# Patient Record
Sex: MALE | Race: WHITE | NOT HISPANIC OR LATINO | URBAN - METROPOLITAN AREA
[De-identification: names, ages, dates, MRNs, and addresses within clinical notes are randomized per-mention and may not be internally consistent; named-entity substitution may affect disease eponyms.]

---

## 2023-11-10 ENCOUNTER — APPOINTMENT (OUTPATIENT)
Dept: UROLOGY | Facility: CLINIC | Age: 33
End: 2023-11-10

## 2024-09-12 ENCOUNTER — LAB (OUTPATIENT)
Dept: LAB | Facility: LAB | Age: 34
End: 2024-09-12
Payer: COMMERCIAL

## 2024-09-12 ENCOUNTER — OFFICE VISIT (OUTPATIENT)
Dept: GASTROENTEROLOGY | Facility: CLINIC | Age: 34
End: 2024-09-12
Payer: COMMERCIAL

## 2024-09-12 VITALS
TEMPERATURE: 98.4 F | BODY MASS INDEX: 28.89 KG/M2 | DIASTOLIC BLOOD PRESSURE: 73 MMHG | WEIGHT: 225 LBS | HEART RATE: 91 BPM | SYSTOLIC BLOOD PRESSURE: 114 MMHG

## 2024-09-12 DIAGNOSIS — R10.9 ABDOMINAL DISCOMFORT: ICD-10-CM

## 2024-09-12 DIAGNOSIS — R19.7 DIARRHEA, UNSPECIFIED TYPE: ICD-10-CM

## 2024-09-12 DIAGNOSIS — R14.0 BLOATING: ICD-10-CM

## 2024-09-12 DIAGNOSIS — R19.7 DIARRHEA, UNSPECIFIED TYPE: Primary | ICD-10-CM

## 2024-09-12 LAB
ALBUMIN SERPL BCP-MCNC: 4.6 G/DL (ref 3.4–5)
ALP SERPL-CCNC: 40 U/L (ref 33–120)
ALT SERPL W P-5'-P-CCNC: 23 U/L (ref 10–52)
ANION GAP SERPL CALC-SCNC: 10 MMOL/L (ref 10–20)
AST SERPL W P-5'-P-CCNC: 29 U/L (ref 9–39)
BILIRUB SERPL-MCNC: 0.8 MG/DL (ref 0–1.2)
BUN SERPL-MCNC: 31 MG/DL (ref 6–23)
CALCIUM SERPL-MCNC: 9.8 MG/DL (ref 8.6–10.6)
CHLORIDE SERPL-SCNC: 105 MMOL/L (ref 98–107)
CO2 SERPL-SCNC: 31 MMOL/L (ref 21–32)
CREAT SERPL-MCNC: 1.36 MG/DL (ref 0.5–1.3)
CRP SERPL-MCNC: <0.1 MG/DL
EGFRCR SERPLBLD CKD-EPI 2021: 71 ML/MIN/1.73M*2
ERYTHROCYTE [DISTWIDTH] IN BLOOD BY AUTOMATED COUNT: 12.6 % (ref 11.5–14.5)
GLIADIN PEPTIDE IGA SER IA-ACNC: <1 U/ML
GLUCOSE SERPL-MCNC: 86 MG/DL (ref 74–99)
HCT VFR BLD AUTO: 38.8 % (ref 41–52)
HGB BLD-MCNC: 13.1 G/DL (ref 13.5–17.5)
MCH RBC QN AUTO: 29 PG (ref 26–34)
MCHC RBC AUTO-ENTMCNC: 33.8 G/DL (ref 32–36)
MCV RBC AUTO: 86 FL (ref 80–100)
NRBC BLD-RTO: 0 /100 WBCS (ref 0–0)
PLATELET # BLD AUTO: 248 X10*3/UL (ref 150–450)
POTASSIUM SERPL-SCNC: 4.4 MMOL/L (ref 3.5–5.3)
PROT SERPL-MCNC: 6.9 G/DL (ref 6.4–8.2)
RBC # BLD AUTO: 4.52 X10*6/UL (ref 4.5–5.9)
SODIUM SERPL-SCNC: 142 MMOL/L (ref 136–145)
TSH SERPL-ACNC: 2.01 MIU/L (ref 0.44–3.98)
TTG IGA SER IA-ACNC: <1 U/ML
WBC # BLD AUTO: 5 X10*3/UL (ref 4.4–11.3)

## 2024-09-12 PROCEDURE — 80053 COMPREHEN METABOLIC PANEL: CPT

## 2024-09-12 PROCEDURE — 36415 COLL VENOUS BLD VENIPUNCTURE: CPT

## 2024-09-12 PROCEDURE — 83516 IMMUNOASSAY NONANTIBODY: CPT

## 2024-09-12 PROCEDURE — 99204 OFFICE O/P NEW MOD 45 MIN: CPT | Performed by: NURSE PRACTITIONER

## 2024-09-12 PROCEDURE — 99214 OFFICE O/P EST MOD 30 MIN: CPT | Performed by: NURSE PRACTITIONER

## 2024-09-12 PROCEDURE — 86140 C-REACTIVE PROTEIN: CPT

## 2024-09-12 PROCEDURE — 85027 COMPLETE CBC AUTOMATED: CPT

## 2024-09-12 PROCEDURE — 84443 ASSAY THYROID STIM HORMONE: CPT

## 2024-09-12 NOTE — PATIENT INSTRUCTIONS
Recommendations  Take Metamucil daily. We would start with 1 teaspoon daily in 8 ounces of water and then after a week increase to 2 teaspoons daily and then in the third week increase to 3 teaspoons daily (1 tablespoon) as tolerated. It is best to start at a low dose and work your way up so that you do not have side effects from the fiber supplement, which may include bloating, increased flatulence. You need to make sure you drink plenty of water when you take the fiber so we can keep the stool softer, bulkier, and easier to pass. Hopefully this would improve the consistency of stool, ease of defecation, and resolve any intermittent diarrhea and bleeding.    Decrease caffeine.  Avoid dairy or if consuming take Lactaid.  Obtain labs and stool tests. If fecal calprotectin is elevated will need colonoscopy to further evaluate.  Further recommendations pending above work-up. Please call the office at [phone] with any questions or concerns.

## 2024-09-12 NOTE — PROGRESS NOTES
Valdo Ernst is a 33 y.o. male with past medical history of lactose intolerance who presents today for bowel issues. He reports a several year history of progressively worsening bowel issues. Worse with certain foods like dairy which he continues to sometimes eat. However he states he has not had a normal bowel movement in over a year and since Summer 2024 has been the worst it has been. He used to have 1 formed stool daily and is now having numerous BM. Stools are smaller and he will have to return a few times back to back in the morning due to incomplete emptying. May have an additional 1-2 later in the day. Sometimes diarrhea. Has seen blood when wiping. Now having abdominal discomfort, lots of bloating. No nausea, vomiting, or unintentional weight loss. No oral ulcerations, joint pains, or skin rashes. Tried Metamucil for 1 week with no change so stopped taking this. No NSAIDS. No prior EGD or colonoscopy. Of note he takes pre-work out supplement daily that has high dose caffeine in it.    Social history: Runs deck building company.  with 2 daughters. No tobacco. Rare alcohol, a few times per year. Denies illicit drug use.    Family history: Father, maternal uncle, paternal grandfather, and maternal cousin have Crohn's disease. Paternal cousin may have celiac disease. Denies family history of colon cancer.     Past Surgical History:   Procedure Laterality Date    OTHER SURGICAL HISTORY  10/29/2021    [place] tooth extraction     No current outpatient medications on file.     No current facility-administered medications for this visit.       Review of Systems  Review of Systems negative except as noted in HPI.    Objective     /73   Pulse 91   Temp 36.9 °C (98.4 °F)   Wt 102 kg (225 lb)   BMI 28.89 kg/m²      Physical Exam  Constitutional:  No acute distress. Normal appearance. Not ill-appearing.  HENT:  Head normocephalic and atraumatic. Conjunctivae normal.  Cardiovascular:  Normal rate.  Regular rhythm.  Pulmonary:  Pulmonary effort normal. No respiratory distress. Breath sounds clear.  Abdominal:  Abdomen is flat and soft. There is no distension. No tenderness or guarding.  Skin: Dry.  Neurological:  Alert and oriented.  Psychiatric:  Mood and affect normal.    Assessment/Plan     33 y.o. male with strong history of Crohn's disease who presents today for initial clinic visit for 1-2 year history of change in bowel habits with increased frequency, smaller/looser consistency, abdominal discomfort, and bloating. We discussed adding fiber supplement as well as labs and stool tests to exclude thyroid dysfunction, celiac disease, and IBD. We also discussed that his pre-work out supplement may be contributing to symptoms and he will plan to decrease dose by half to see if this makes a difference in his symptoms.    Recommendations  Take Metamucil daily. We would start with 1 teaspoon daily in 8 ounces of water and then after a week increase to 2 teaspoons daily and then in the third week increase to 3 teaspoons daily (1 tablespoon) as tolerated. It is best to start at a low dose and work your way up so that you do not have side effects from the fiber supplement, which may include bloating, increased flatulence. You need to make sure you drink plenty of water when you take the fiber so we can keep the stool softer, bulkier, and easier to pass. Hopefully this would improve the consistency of stool, ease of defecation, and resolve any intermittent diarrhea and bleeding.    Decrease work-out supplement/caffeine.  Avoid dairy or if consuming take Lactaid.  Obtain labs and stool tests. If fecal calprotectin is elevated will need colonoscopy to further evaluate. This was briefly discussed today.  Further recommendations pending above work-up.     Electronically signed by: Modesta Cole CNP on 9/12/2024 at 1:52 PM

## 2024-09-13 ENCOUNTER — LAB (OUTPATIENT)
Dept: LAB | Facility: LAB | Age: 34
End: 2024-09-13
Payer: COMMERCIAL

## 2024-09-13 DIAGNOSIS — R19.7 DIARRHEA, UNSPECIFIED TYPE: ICD-10-CM

## 2024-09-13 DIAGNOSIS — R10.9 ABDOMINAL DISCOMFORT: ICD-10-CM

## 2024-09-13 PROCEDURE — 83993 ASSAY FOR CALPROTECTIN FECAL: CPT

## 2024-09-15 LAB
GLIADIN PEPTIDE IGG SER IA-ACNC: <0.56 FLU (ref 0–4.99)
TTG IGG SER IA-ACNC: <0.82 FLU (ref 0–4.99)

## 2024-09-17 LAB — CALPROTECTIN STL-MCNT: 39 UG/G

## 2024-10-17 ASSESSMENT — ENCOUNTER SYMPTOMS
CARDIOVASCULAR NEGATIVE: 1
ARTHRALGIAS: 1
MYALGIAS: 1
RESPIRATORY NEGATIVE: 1
CONSTITUTIONAL NEGATIVE: 1

## 2024-10-17 NOTE — PATIENT INSTRUCTIONS
May use PRICE therapy as needed.  Start into Physical Therapy 1-2 times a week for 8-10 weeks with manual therapy as well as dry needling and IASTM  Stressed the importance of wearing shoes with good stability control to help with the biomechanics affecting the lower legs  Stressed the importance of wearing full foot insoles to help with the biomechanics affecting the lower legs  Recommendation over-the-counter calcium 500mg, 3 times a day with vitamin-D3 8426-2234+ units a day with food as well as a daily multivitamin  Recommendation over-the-counter Move Free for joint health.  May take OTC Tylenol Extra Strength or OTC Tylenol Arthritis, taking one every 6-8 hours with food as needed for pain management.  Patient advised regarding the risk and/or potential adverse reactions and/or side effects of any prescribed medications along with any over-the-counter medications or any supplements used. Patient advised to seek immediate medical care if any adverse reactions occur. The patient and/or patient(s) parent(s) verbalized their understanding.  Discussed in detail with the patient to the level of their understanding the possibility in the future of regenerative injections versus corticosteroids injections  Possibility in future of MR Arthrogram of LEFT HIP to rule out tendon vs ligament vs labral tear vs fracture vs other. MSK to read  Possibility in future of MRI of LUMBAR spine to rule out disc herniation vs fracture vs other. MSK to read   Follow up 6 weeks

## 2024-10-17 NOTE — PROGRESS NOTES
New patient  History Of Present Illness  Valdo Ernst is a 33 y.o. male presenting with LEFT hip pain. Pt owns a deck building company.  Pt states that a majority of his past injuries have been on his LEFT side. In the past year, he has tried to go away from strength training and in to more stretching. Pain has worsened within the past month that he is in today for further evaluation. Pain in the sciatica, wrapping in to lateral aspect of his hip in to his groin and down his leg. Notes tightness and pain in all movement. Pain is to the point of it waking him up. Pain with all range of motion. Denies any numbness or tingling. Pt has tried: foam rolling, stretching, soft tissue massage, heat, ice, NSAIDs as needed. Rates current pain as a 0/10 but with activity and daily activities a 8/10.  We discussed treatment options.  Upon exam patient has indications of an SI joint dysfunction as well as a upper glutes strain and low back strain.  Patient also has a leg length discrepancy noted on exam.  As such after discussion we will order x-rays of the lower back and hips and pelvis to evaluate for leg length discrepancy or other occult pathology.  Patient will start into physical therapy as soon as he is able.  Advised patient to look into getting insoles and wearing supportive footwear.  Patient can follow-up in 6 weeks and we can reevaluate at that time with consideration of more advanced imaging such as MRI or CT is indicated.  Patient verbalizes understanding and agreement with plan of care.    Past Medical History  He has no past medical history on file.    Surgical History  He has a past surgical history that includes Other surgical history (10/29/2021) and Vasectomy.     Social History  He reports that he has never smoked. He has never used smokeless tobacco. He reports current alcohol use. He reports that he does not use drugs.    Family History  Family History   Problem Relation Name Age of Onset    Crohn's  "disease Father          Allergies  Patient has no known allergies.    Review of Systems  Review of Systems   Constitutional: Negative.    Respiratory: Negative.     Cardiovascular: Negative.    Musculoskeletal:  Positive for arthralgias and myalgias.   All other systems reviewed and are negative.    Last Recorded Vitals  /78 (BP Location: Right arm, Patient Position: Sitting, BP Cuff Size: Large adult)   Pulse 67   Ht 1.88 m (6' 2\")   Wt 104 kg (230 lb)   BMI 29.53 kg/m²      The , VIKY RUBIN was present during today's visit and not limited to physical examination!    Examination:  Left Hip   Edema: Negative   Ecchymosis/Bruising: Negative.   Percussion Test: Negative.   Tuning Fork Test: Negative.   Orientation: Symmetrical.     ROM:   Positive Internal Rotation (30-35 degrees) decreased,  Positive External Rotation (45-60 degrees) decreased,  Flexion (120 degrees)   Extension (15-30 degrees)   ABduction (45-50 degrees)   Positive ADduction (20-30 degrees) decreased, causes pain  FROM Sacral Flexion and Sacral Extension.            Muscle Strength:   +5/+5 Hamstring Flexion  +5/+5 Quadricep Extension         +5/+5 Hip Flexion   +5/+5Hip Extension                   +5/+5 Hip ABduction away from body   +5/+5 Hip ADduction towards body           +5/+5 Hip Internal Rotation at 90 Degrees   +5/+5 Hip External Rotation at 90 Degrees                   +5/+5 Hip Internal Rotation at 0 Degrees   +5/+5 Hip External Rotation at 0 Degrees     DTR/Neurological:  Sensation Intact, 2-Point Discrimination: Negative.            Palpation:  Positive  tender to palpation over Left Hip Flexor and ADDuctor(s)           Vascular:   Negative: Normal Pulses   +2/+4, Femoral Artery, DP, PT  Capillary Refill < 2 seconds.            Function Tests:  Test for Rectus Femoris Contracture: Positive  Hip Extension Test: Positive    Iliotibial Tract Test: Positive    Mark Anthony :  Positive    Mark Anthony Test:  Positive    Steve " Test: Positive              Hip/Pelvis - Flexibility:  Hamstring Positive   Hip Flexor  Positive    IT-Band  Positive          Hip/Pelvis - Hip Contractures:  Finger Tip Test: Negative.   Mark Anthony Test: Negative.   Mark Anthony  Test: Negative.   Piriformis Test: Negative.     Hip/Pelvis - Hip Dysplasia:  Trochanter Irritation Sign: Negative.   Galeazzi Test: Negative.   Kalchschmidt Test: Negative.            Hip/Pelvis - Impingement/Labrum:  GAVIN Test: Negative      FADIR Test:   Negative   Hip Scouring Test:  Positive        Mcdaniel's Test:  Positive        Posterior Labrum (Posterior Margin) Test:  Negative      Posterior Impingement (Posterior Labrum) [Torque] Test: Negative    Anterior Impingement (Anterior Labrum) Test: Negative   Psoas Sign:   Negative               Hip/Pelvis - IT Band Syndrome:  Steve's (Iliotibial Tract) Test: Negative.   Mark Compression Test: Negative.   J-Sign: Negative.            Hip/Pelvis - SFE:  Drehmann Test: Negative.            Hip/Pelvis - Trochanteric/Pelvis Muscle Function:  Trendelenburg/Duchenne Sign: Negative.   Duchenne Sign: Negative.      Leg Length:  Leg Length Supine: Positive LEFT leg shorter than the Right  and Will verify with standing erect pelvis xray   Leg Length Supine to Seated (Derbolowsky Sign): Positive LEFT leg shorter than the Right  and Will verify with standing erect pelvis xray   ---------------------------------------------------  Examination:  Left Lumbar Spine  Edema: Negative.   TART Findings: Positive  Tenderness Left lower lumbar spine.   Ecchymosis/Bruising: Negative.   Percussion Test (LUMBAR): Negative.   Tuning Fork Test (LUMBAR): Negative.   Percussion (Sacrum): Negative.   Tuning Fork (Sacrum): Negative.     Orientation:  Orientation (LUMBAR): Negative    Orientation (Sacrum): Negative     ROM (LUMBAR):   Negative     Sacrum:  Standing Flexion Test: Negative   Seated Flexion Test: Negative   Spring Test: Negative   Sacral Somatic  Dysfunction: Negative   Hip Flexor Tightness: Positive   Hamstring Tightness: Positive           Muscle Strength:   +5/+5 Hamstring Flexion  +5/+5 Quadricep Extension  +5/+5 Hip Flexion  +5/+5 Hip Extension  +5/+5 Hip ABduction toward body  +5/+5 Hip ADduction away from body  +5/+5 Hip Internal Rotation at 90 Degrees  +5/+5 Hip External Rotation at 90 Degrees  +5/+5 Hip Internal Rotation at 0 Degrees  +5/+5 Hip External Rotation at 0 Degrees.            DTR/Neurological: 2-Point Discrimination:  Negative,Toe Walk normal,Heel Walk normal   +2/+4 Patellar Reflex (L-4)  +2/+4 Posterior Tibialis and Medial Hamstrings Reflex (L5)  +2/+4 Achilles Reflex (S-1).            Sensation/Neurological Lumbar:   Negative Sensation Intact, 2-Point Discrimination    Negative L1: Low back, hips, and groin  Negative L2: Low back and front of inside of upper leg/thigh  Negative L3: Low back and front of upper leg/thigh  Negative L4: Low back, front of upper leg/thigh, front of lower leg/calf, front of medial area of knee, and inside of ankle  Negative L5: Low back, front and lateral knee, front and outside of lower leg/calf, top and bottom of foot and first four toes especially big toe.            Sensation/Neurological Sacrum:   Negative  Sensation Intact, 2 -Point Discrimination Test:    Negative S1: low back, back of upper leg/thigh, back and inside of lower leg, calf and little toe  Negative S2: Buttocks, genitals, back of upper leg/thigh and calves  Negative S3: Buttocks and genitals  Negative S4: Buttocks  Negative S5: Buttocks.     Sensation/Neurological Coccygeal:   Negative Sensation Intact, 2-Point Discrimination:    Negative Coccyx: Buttocks and area of tailbone.     Palpation: Positive  Tender to Palpation over the Left Lumbar Spine            Vascular:   Capillary Refill < 2 seconds  +2/+4Carotid  +2/+4 Dorsalis Pedis  +2/+4 Posterior Tibial.                Low Back-Disc Injury:  Valsalva Maneuver: Negative.   Fermoral  Nerve Traction Test: Negative.   Slump Test: Negative.   Cross Test Seated: Negative.   Seated Straight Leg Raise: Negative.   Laseague Sign: Negative.   Laseague Differential Test: Negative.   [place]ague Drop Test: Negative.   Seated Laseague Test: Negative.    Reverse Laseague Test: Negative.   Tip Toe Heel Walking Test: Negative.   Jose Prone Knee Flexion Test: Negative.   Multifidus Toe Touch Test (MT3): Negative.  Prone Instability Test (PIT): Negative  Multifidus Lift Test (MLT): Negative       Low Back-SI Joint:  Three-Phase Hyperextension Test: Negative.    Yeoman Test: Negative.   Sacroilliac Stress Test: Negative.   Abduction Stress Test: Negative.           Low Back-Spondy:  Stork Test: Negative.   Sphinx Test: Negative.   Modified Sphinx Test: Negative.         Feet/Foot:   Positive BILATERAL Valgus foot     Imaging and Diagnostics Review:  Plain radiograph imaging ordered and independently reviewed.  X-rays independently reviewed no acute fractures or dislocations noted patient does have some mild degenerative disc disease of the lower back as well as a mild leg length discrepancy as noted below patient x-rays appear otherwise unremarkable.  Left leg shorter than the Right leg by the following  Iliac Crest 3 mm   Sacral Base 2 mm  Femoral heads 4 mm   Median difference of Left leg shorter than the Right leg is 3 mm   Assessment   1. Left hip pain    2. Muscle strain of gluteal region, left, initial encounter    3. Strain of flexor muscle of left hip, initial encounter    4. SI (sacroiliac) joint dysfunction    5. Lumbar strain, initial encounter    6. Left hip impingement syndrome    7. Leg length discrepancy        Plan   Treatment or Intervention:  May use PRICE therapy as needed.  Start into Physical Therapy 1-2 times a week for 8-10 weeks with manual therapy as well as dry needling and IASTM  Stressed the importance of wearing shoes with good stability control to help with the biomechanics  affecting the lower legs  Stressed the importance of wearing full foot insoles to help with the biomechanics affecting the lower legs  Recommendation over-the-counter calcium 500mg, 3 times a day with vitamin-D3 2294-0400+ units a day with food as well as a daily multivitamin  Recommendation over-the-counter Move Free for joint health.  May take OTC Tylenol Extra Strength or OTC Tylenol Arthritis, taking one every 6-8 hours with food as needed for pain management.  Patient advised regarding the risk and/or potential adverse reactions and/or side effects of any prescribed medications along with any over-the-counter medications or any supplements used. Patient advised to seek immediate medical care if any adverse reactions occur. The patient and/or patient(s) parent(s) verbalized their understanding.  Discussed in detail with the patient to the level of their understanding the possibility in the future of regenerative injections versus corticosteroids injections  Possibility in future of MR Arthrogram of LEFT HIP to rule out tendon vs ligament vs labral tear vs fracture vs other. MSK to read  Possibility in future of MRI of LUMBAR spine to rule out disc herniation vs fracture vs other. MSK to read     Diagnostic studies:         Activity Instructions, Restrictions, and Accommodations:      Consultations/Referrals:  Physical therapy    Follow-up:  Follow up 6 weeks  Total appointment time _45_ minutes. Greater than 50% spent counseling patient on results of physical exam, treatment options as well as results of ordered imaging and treatment for results, need for PT and expected outcomes, as well as discussing possible medications.    Hao Sahni CNP

## 2024-10-18 ENCOUNTER — HOSPITAL ENCOUNTER (OUTPATIENT)
Dept: RADIOLOGY | Facility: CLINIC | Age: 34
Discharge: HOME | End: 2024-10-18
Payer: COMMERCIAL

## 2024-10-18 ENCOUNTER — OFFICE VISIT (OUTPATIENT)
Dept: SPORTS MEDICINE | Facility: CLINIC | Age: 34
End: 2024-10-18
Payer: COMMERCIAL

## 2024-10-18 VITALS
SYSTOLIC BLOOD PRESSURE: 122 MMHG | HEIGHT: 74 IN | DIASTOLIC BLOOD PRESSURE: 78 MMHG | HEART RATE: 67 BPM | BODY MASS INDEX: 29.52 KG/M2 | WEIGHT: 230 LBS

## 2024-10-18 DIAGNOSIS — M21.70 LEG LENGTH DISCREPANCY: ICD-10-CM

## 2024-10-18 DIAGNOSIS — M53.3 SI (SACROILIAC) JOINT DYSFUNCTION: ICD-10-CM

## 2024-10-18 DIAGNOSIS — S76.012A MUSCLE STRAIN OF GLUTEAL REGION, LEFT, INITIAL ENCOUNTER: ICD-10-CM

## 2024-10-18 DIAGNOSIS — M25.552 LEFT HIP PAIN: ICD-10-CM

## 2024-10-18 DIAGNOSIS — S39.012A LUMBAR STRAIN, INITIAL ENCOUNTER: ICD-10-CM

## 2024-10-18 DIAGNOSIS — S76.012A STRAIN OF FLEXOR MUSCLE OF LEFT HIP, INITIAL ENCOUNTER: ICD-10-CM

## 2024-10-18 DIAGNOSIS — M25.852 LEFT HIP IMPINGEMENT SYNDROME: ICD-10-CM

## 2024-10-18 PROCEDURE — 1036F TOBACCO NON-USER: CPT | Performed by: NURSE PRACTITIONER

## 2024-10-18 PROCEDURE — 73502 X-RAY EXAM HIP UNI 2-3 VIEWS: CPT | Mod: LT

## 2024-10-18 PROCEDURE — 3008F BODY MASS INDEX DOCD: CPT | Performed by: NURSE PRACTITIONER

## 2024-10-18 PROCEDURE — 99204 OFFICE O/P NEW MOD 45 MIN: CPT | Performed by: NURSE PRACTITIONER

## 2024-10-18 PROCEDURE — 72100 X-RAY EXAM L-S SPINE 2/3 VWS: CPT

## 2024-10-18 PROCEDURE — 99214 OFFICE O/P EST MOD 30 MIN: CPT | Performed by: NURSE PRACTITIONER

## 2024-10-18 ASSESSMENT — PATIENT HEALTH QUESTIONNAIRE - PHQ9
2. FEELING DOWN, DEPRESSED OR HOPELESS: NOT AT ALL
SUM OF ALL RESPONSES TO PHQ9 QUESTIONS 1 AND 2: 0
1. LITTLE INTEREST OR PLEASURE IN DOING THINGS: NOT AT ALL

## 2024-10-18 ASSESSMENT — PAIN SCALES - GENERAL: PAINLEVEL_OUTOF10: 0-NO PAIN

## 2024-10-18 ASSESSMENT — ENCOUNTER SYMPTOMS
DEPRESSION: 0
OCCASIONAL FEELINGS OF UNSTEADINESS: 0
LOSS OF SENSATION IN FEET: 0

## 2024-11-06 ENCOUNTER — EVALUATION (OUTPATIENT)
Dept: PHYSICAL THERAPY | Facility: CLINIC | Age: 34
End: 2024-11-06
Payer: COMMERCIAL

## 2024-11-06 DIAGNOSIS — M21.70 LEG LENGTH DISCREPANCY: ICD-10-CM

## 2024-11-06 DIAGNOSIS — S39.012A LUMBAR STRAIN, INITIAL ENCOUNTER: ICD-10-CM

## 2024-11-06 DIAGNOSIS — S76.012A MUSCLE STRAIN OF GLUTEAL REGION, LEFT, INITIAL ENCOUNTER: ICD-10-CM

## 2024-11-06 DIAGNOSIS — S76.012A STRAIN OF FLEXOR MUSCLE OF LEFT HIP, INITIAL ENCOUNTER: ICD-10-CM

## 2024-11-06 DIAGNOSIS — M25.552 LEFT HIP PAIN: ICD-10-CM

## 2024-11-06 DIAGNOSIS — M25.852 LEFT HIP IMPINGEMENT SYNDROME: ICD-10-CM

## 2024-11-06 DIAGNOSIS — M53.3 SI (SACROILIAC) JOINT DYSFUNCTION: ICD-10-CM

## 2024-11-06 PROCEDURE — 97161 PT EVAL LOW COMPLEX 20 MIN: CPT | Mod: GP

## 2024-11-06 ASSESSMENT — PAIN SCALES - GENERAL: PAINLEVEL_OUTOF10: 2

## 2024-11-06 ASSESSMENT — PAIN - FUNCTIONAL ASSESSMENT: PAIN_FUNCTIONAL_ASSESSMENT: 0-10

## 2024-11-06 NOTE — PROGRESS NOTES
Physical Therapy Evaluation    Patient Name: Valdo Ernst  MRN: [id number]  Evaluation Date: 11/6/2024  Time Calculation  Start Time: 0700  Stop Time: 0730  Time Calculation (min): 30 min  PT Evaluation Time Entry  PT Evaluation (Low) Time Entry: 30          Problem List Items Addressed This Visit             ICD-10-CM    Left hip pain M25.552    Relevant Orders    Follow Up In Physical Therapy    Muscle strain of gluteal region, left, initial encounter S76.012A    Relevant Orders    Follow Up In Physical Therapy    Strain of flexor muscle of left hip S76.012A    Relevant Orders    Follow Up In Physical Therapy    SI (sacroiliac) joint dysfunction M53.3    Relevant Orders    Follow Up In Physical Therapy    Lumbar strain, initial encounter S39.012A    Relevant Orders    Follow Up In Physical Therapy    Left hip impingement syndrome M25.852    Relevant Orders    Follow Up In Physical Therapy    Leg length discrepancy M21.70    Relevant Orders    Follow Up In Physical Therapy         Subjective   General:  General  Reason for Referral: L lower back, gluteal, L groin pain  Referred By: Hao Sahni, DEISY-CNP  Past Medical History Relevant to Rehab: 32 y/o M who presents for PT evaluation with cc of L groin pain with additional intermittent symptoms in L piriformis/gluteal and lower back region.    Patient reported hx of condition: Insidious onset L hip pain last few months    Surgery:   No    Precautions:   None    Relevant PMH:  None    Red flags: NO    Pain:  Pain Assessment: 0-10  0-10 (Numeric) Pain Score: 2  Pain Type: Acute pain  Pain Location: Groin  Pain Orientation: Left  Pain Radiating Towards: Intermittent symptoms L gluteal and lower back.    Prior Function Per Pt/Caregiver Report:  Ambulatory Assistance: Independent    Imaging:  XR L hip -  No MRI at this time    OBJECTIVE:  Objective   Posture:  Posture Comment: LLE<RLE 3 mm per XR  Range of Motion:  Range of Motion Comments: L hip flexion  limited to 80 degrees AROM flexion  Strength:  Strength Comments: 4-/5 L hip ADD/ABD/IR/ER  Flexibility:  Flexibility Comment: Tight L piriformis, gluteal, adductors, HS's  Palpation:  Palpation Comment: Tender L groin proximally  Special Tests:  Special Tests Comment: + Scour LLE, LLE SLR, + L ammy and ROC LLE  Gait:  Gait Comment: WNL  Balance:  Balance Comment: WNL LLE  Stairs:  Stairs Comment: WNL  Bed Mobility:  Bed Mobility Comment: WNL  Transfers:  Transfers Comment: WNL  Other:  Comment: Hypomobile R SI    Outcome Measures:  0% womac    Assessment  PT Assessment Results: Decreased range of motion, Decreased strength, Decreased endurance, Decreased mobility, Pain  Rehab Prognosis: Good  Barriers to Discharge: None  Evaluation/Treatment Tolerance: Patient limited by pain    Pt is a 33 y.o. male who presents with impairments of L groin pain, impaired L hip AROM/strength. These impairments have led to functional limitations including painful, impaired mobility. Pt would benefit from skilled physical therapy intervention to improve above impairments and facilitate return to function.    Complexity of Evaluation: Low    Based on the history including personal factors and/or comorbidities, examination of body systems including body structures and function, activity limitations, and/or participation restrictions, as well as clinical presentation, patient meets criteria for above complexity evaluation.    Plan  Treatment/Interventions: Aquatic therapy, Cryotherapy, Dry needling, Education/ Instruction, Electrical stimulation, Gait training, Manual therapy, Neuromuscular re-education, Ultrasound, Therapeutic exercises, Therapeutic activities, Taping techniques  PT Plan: Skilled PT  PT Frequency: 2 times per week  Onset Date: 11/06/24  Number of Treatments Authorized: MN, re-evaluate visit 10  Rehab Potential: Good  Plan of Care Agreement: Patient    Insurance Plan: Payor: [employer] / Plan: [employer]AN / Product  Type: *No Product type* /     Plan for next visit: DN and manual to L groin    OP EDUCATION:  Outpatient Education  Individual(s) Educated: Patient  Education Provided: POC  Risk and Benefits Discussed with Patient/Caregiver/Other: yes  Patient/Caregiver Demonstrated Understanding: yes  Plan of Care Discussed and Agreed Upon: yes  Patient Response to Education: Patient/Caregiver Verbalized Understanding of Information    Today's Treatment:  Evaluation and discussion only  HEP to be completed daily, exercises include:  Inferior L hip distraction with band, butter fly stretch, self STM L groin    Goals:  Active       PT goals       STG's       Start:  11/06/24    Expected End:  12/21/24       STG 1: Patient will be IND with LLE groin/HS flexibility and hip mobilization HEP with band x 3 visits         LTG's       Start:  11/06/24    Expected End:  02/04/25       LTG 1: Patient will report no groin pain x 2 weeks  LTG 2: Patient will demonstrate 0-110 L hip flexion AROM for improved LLE flexibility  LTG 3: Patient will demonstrate 5/5 L hip strength.

## 2024-11-13 ENCOUNTER — TREATMENT (OUTPATIENT)
Dept: PHYSICAL THERAPY | Facility: CLINIC | Age: 34
End: 2024-11-13
Payer: COMMERCIAL

## 2024-11-13 DIAGNOSIS — M21.70 LEG LENGTH DISCREPANCY: ICD-10-CM

## 2024-11-13 DIAGNOSIS — M53.3 SI (SACROILIAC) JOINT DYSFUNCTION: ICD-10-CM

## 2024-11-13 DIAGNOSIS — S76.012A STRAIN OF FLEXOR MUSCLE OF LEFT HIP, INITIAL ENCOUNTER: ICD-10-CM

## 2024-11-13 DIAGNOSIS — S39.012A LUMBAR STRAIN, INITIAL ENCOUNTER: ICD-10-CM

## 2024-11-13 DIAGNOSIS — S76.012A MUSCLE STRAIN OF GLUTEAL REGION, LEFT, INITIAL ENCOUNTER: ICD-10-CM

## 2024-11-13 DIAGNOSIS — M25.552 LEFT HIP PAIN: ICD-10-CM

## 2024-11-13 DIAGNOSIS — M25.852 LEFT HIP IMPINGEMENT SYNDROME: ICD-10-CM

## 2024-11-13 PROCEDURE — 97110 THERAPEUTIC EXERCISES: CPT | Mod: GP | Performed by: PHYSICAL THERAPIST

## 2024-11-13 NOTE — PROGRESS NOTES
Physical Therapy Treatment    Patient Name: Valdo Ernst  MRN: [id number]  Encounter date: 11/13/2024    Time Calculation  Start Time: 0730  Stop Time: 0815  Time Calculation (min): 45 min  PT Therapeutic Procedures Time Entry  Therapeutic Exercise Time Entry: 45    Visit # 2 of 12  Visits/Dates Authorized: 11/5/24:  [employer] - NO AUTH / $3400 DEDUCT not met / $7000 OOP not met / 80% Coins / 60 DAYS/YR - 0 USED / PER [employer] W/S / REF: [id number] / ds      Current Problem:   Problem List Items Addressed This Visit             ICD-10-CM    Left hip pain M25.552    Muscle strain of gluteal region, left, initial encounter S76.012A    Strain of flexor muscle of left hip S76.012A    SI (sacroiliac) joint dysfunction M53.3    Lumbar strain, initial encounter S39.012A    Left hip impingement syndrome M25.852    Leg length discrepancy M21.70       Precautions:    none       Subjective   General: Pt reports that he tried the heel lift, but did not notice any change, so stopped wearing it. Most pain is with quadruped with shifting weight on the L/twisting.       Pre-Treatment Symptoms:        Objective   Findings: Hyper mobility L hip.      Treatments:      Therapeutic Exercise 22082:   Bike L5 x 6 min   Tband SS green x 3  Hip flexion iso  Bridge  Educated pt to do knee flexion/hip extension with tband  RDL    Neuromuscular Re-Ed 74561:       Therapeutic Activity 49251:      Gait Training 03484:       Manual Therapy 85241:   DN 50mm to L add emanuel in supine x 5 min   Hip jt mobs, long axis distraction, distraction and posterior glides.   Modalities:         HEP / Access Codes:   Access Code: [email or web address]  URL: [email or web address]  Date: 11/13/2024  Prepared by: Robe Davalos    Exercises  - Side Stepping with Resistance at Ankles  - 1 x daily - 3 x weekly - 3 sets - 10 reps  - Hooklying Isometric Hip Flexion  - 1 x daily - 3 x weekly - 2 sets - 12 reps  - Supine Bridge  - 1 x daily - 3 x weekly - 2 sets - 12 reps  - Standing  Hip Extension and Flexion with Resistance  - 1 x daily - 2-3 x weekly - 2 sets - 12 reps  - Divehi Deadlift With Barbell  - 1 x daily - 2-3 x weekly - 3 sets - 10 reps  Assessment: Pt is frustrated, has been working on this for 2 years, but has decreased hip /core stability.  Tolerated exercises, jt mobs and DN well.        Post-Treatment Symptoms:      0/10  Plan: steamboats OK[employer] and CKC stability, Tband IR/ER check RDL form.          Goals:   Active       PT goals       STG's       Start:  11/06/24    Expected End:  12/21/24       STG 1: Patient will be IND with LLE groin/HS flexibility and hip mobilization HEP with band x 3 visits         LTG's       Start:  11/06/24    Expected End:  02/04/25       LTG 1: Patient will report no groin pain x 2 weeks  LTG 2: Patient will demonstrate 0-110 L hip flexion AROM for improved LLE flexibility  LTG 3: Patient will demonstrate 5/5 L hip strength.

## 2024-11-22 ENCOUNTER — TREATMENT (OUTPATIENT)
Dept: PHYSICAL THERAPY | Facility: CLINIC | Age: 34
End: 2024-11-22
Payer: COMMERCIAL

## 2024-11-22 DIAGNOSIS — S76.012A STRAIN OF FLEXOR MUSCLE OF LEFT HIP, INITIAL ENCOUNTER: ICD-10-CM

## 2024-11-22 DIAGNOSIS — M21.70 LEG LENGTH DISCREPANCY: ICD-10-CM

## 2024-11-22 DIAGNOSIS — S76.012A MUSCLE STRAIN OF GLUTEAL REGION, LEFT, INITIAL ENCOUNTER: ICD-10-CM

## 2024-11-22 DIAGNOSIS — M25.552 LEFT HIP PAIN: ICD-10-CM

## 2024-11-22 DIAGNOSIS — S39.012A LUMBAR STRAIN, INITIAL ENCOUNTER: ICD-10-CM

## 2024-11-22 DIAGNOSIS — M25.852 LEFT HIP IMPINGEMENT SYNDROME: ICD-10-CM

## 2024-11-22 DIAGNOSIS — M53.3 SI (SACROILIAC) JOINT DYSFUNCTION: ICD-10-CM

## 2024-11-22 PROCEDURE — 97140 MANUAL THERAPY 1/> REGIONS: CPT | Mod: GP | Performed by: PHYSICAL THERAPIST

## 2024-11-22 PROCEDURE — 97110 THERAPEUTIC EXERCISES: CPT | Mod: GP | Performed by: PHYSICAL THERAPIST

## 2024-11-22 NOTE — PROGRESS NOTES
Physical Therapy Treatment    Patient Name: Valdo Ernst  MRN: [id number]  Encounter date: 11/22/2024    Time Calculation  Start Time: 0733  Stop Time: 0832  Time Calculation (min): 59 min  PT Therapeutic Procedures Time Entry  Manual Therapy Time Entry: 10  Therapeutic Exercise Time Entry: 45    Visit # 3 of 12  Visits/Dates Authorized: 11/5/24:  [employer] - NO AUTH / $3400 DEDUCT not met / $7000 OOP not met / 80% Coins / 60 DAYS/YR - 0 USED / PER [employer] W/S / REF: [id number] / ds      Current Problem:   Problem List Items Addressed This Visit             ICD-10-CM    Left hip pain M25.552    Muscle strain of gluteal region, left, initial encounter S76.012A    Strain of flexor muscle of left hip S76.012A    SI (sacroiliac) joint dysfunction M53.3    Lumbar strain, initial encounter S39.012A    Left hip impingement syndrome M25.852    Leg length discrepancy M21.70         Precautions:    none       Subjective   General: No significant change since last session, no problems with DN    Pre-Treatment Symptoms:        Objective   Findings: Hyper mobility L hip.      Treatments:      Therapeutic Exercise 27609:   Elypitcal L 5 x 8 min   FT row 35 # unilateral L/R 15x  Ball squish 5 sec hold x 15  Cross band side step purple monster band 3 steps L/R x 5  [employer] Step up 2 risers with 30# KB L/R 15 x  Hip ext 100# 15 x L/R  Hip add 50# L/R 15 x  Ham curl  80# 15 DL  Plank 60 sec  4 way hip   Seated TB hip IR/ER iso 15x    Neuromuscular Re-Ed 17188:       Therapeutic Activity 91641:      Gait Training 84298:       Manual Therapy 72359:   DN 50mm to L Greater troch, glut med, piriformis in supine x 5 min   Hip jt mobs, long axis distraction, distraction and posterior glides.   Modalities:         HEP / Access Codes:   Access Code: [email or web address]  URL: [email or web address]  Date: 11/13/2024  Prepared by: Robe Davalos    Exercises  - Side Stepping with Resistance at Ankles  - 1 x daily - 3 x weekly - 3 sets - 10 reps  - Hooklying  "Isometric Hip Flexion  - 1 x daily - 3 x weekly - 2 sets - 12 reps  - Supine Bridge  - 1 x daily - 3 x weekly - 2 sets - 12 reps  - Standing Hip Extension and Flexion with Resistance  - 1 x daily - 2-3 x weekly - 2 sets - 12 reps  - Uzbek Deadlift With Barbell  - 1 x daily - 2-3 x weekly - 3 sets - 10 reps    Assessment: Tolerated session well. Working hard.  No pain with any exercises. Tolerated DN well        Post-Treatment Symptoms:      0/10  Plan: avoid the \"dull ache/tightness\" and progress core and hip strength and stability          Goals:   Active       PT goals       STG's       Start:  11/06/24    Expected End:  12/21/24       STG 1: Patient will be IND with LLE groin/HS flexibility and hip mobilization HEP with band x 3 visits         LTG's       Start:  11/06/24    Expected End:  02/04/25       LTG 1: Patient will report no groin pain x 2 weeks  LTG 2: Patient will demonstrate 0-110 L hip flexion AROM for improved LLE flexibility  LTG 3: Patient will demonstrate 5/5 L hip strength.           "

## 2024-11-29 ENCOUNTER — APPOINTMENT (OUTPATIENT)
Dept: PHYSICAL THERAPY | Facility: CLINIC | Age: 34
End: 2024-11-29
Payer: COMMERCIAL

## 2024-12-04 ENCOUNTER — TREATMENT (OUTPATIENT)
Dept: PHYSICAL THERAPY | Facility: CLINIC | Age: 34
End: 2024-12-04
Payer: COMMERCIAL

## 2024-12-04 DIAGNOSIS — M53.3 SI (SACROILIAC) JOINT DYSFUNCTION: ICD-10-CM

## 2024-12-04 DIAGNOSIS — S76.012A MUSCLE STRAIN OF GLUTEAL REGION, LEFT, INITIAL ENCOUNTER: ICD-10-CM

## 2024-12-04 DIAGNOSIS — S76.012A STRAIN OF FLEXOR MUSCLE OF LEFT HIP, INITIAL ENCOUNTER: ICD-10-CM

## 2024-12-04 DIAGNOSIS — M21.70 LEG LENGTH DISCREPANCY: ICD-10-CM

## 2024-12-04 DIAGNOSIS — S39.012A LUMBAR STRAIN, INITIAL ENCOUNTER: ICD-10-CM

## 2024-12-04 DIAGNOSIS — M25.552 LEFT HIP PAIN: ICD-10-CM

## 2024-12-04 DIAGNOSIS — M25.852 LEFT HIP IMPINGEMENT SYNDROME: ICD-10-CM

## 2024-12-04 PROCEDURE — 97140 MANUAL THERAPY 1/> REGIONS: CPT | Mod: GP | Performed by: PHYSICAL THERAPIST

## 2024-12-04 PROCEDURE — 97110 THERAPEUTIC EXERCISES: CPT | Mod: GP | Performed by: PHYSICAL THERAPIST

## 2024-12-04 NOTE — PROGRESS NOTES
Physical Therapy Treatment    Patient Name: Valdo Ernst  MRN: [id number]  Encounter date: 12/4/2024    Time Calculation  Start Time: 0700    Visit # 4 of 12  Visits/Dates Authorized: 11/5/24:  [employer] - NO AUTH / $3400 DEDUCT not met / $7000 OOP not met / 80% Coins / 60 DAYS/YR - 0 USED / PER [employer] W/S / REF: [id number] / ds      Current Problem:   Problem List Items Addressed This Visit             ICD-10-CM    Left hip pain M25.552    Muscle strain of gluteal region, left, initial encounter S76.012A    Strain of flexor muscle of left hip S76.012A    SI (sacroiliac) joint dysfunction M53.3    Lumbar strain, initial encounter S39.012A    Left hip impingement syndrome M25.852    Leg length discrepancy M21.70           Precautions:    none       Subjective   General: Pt reports that he has stopped doing the things that would aggravate it, including stretching, etc, and it does feel better. He does have some tightness, but overall doing well.     Pre-Treatment Symptoms:        Objective   Findings: Hyper mobility L hip.      Treatments:      Therapeutic Exercise 14536:   Elypitcal L 5 x 8 min   FT RDL 20# 15x, 50# 10 x 3  FT row 35 # unilateral L/R 15x  Ball squish 5 sec hold x 15  Cross band side step purple monster band 3 steps L/R x 5  [employer] Step up 2 risers with 35# KB L/R 15 x  Hip ext 100# 15 x L/R  Hip add 60# L/R 15 x  Ham curl  80# 15 DL  Plank 60 sec legs on tball/ pushup position  4 way hip HEP  Seated TB hip IR/ER iso 15x purple - heavy pull    Neuromuscular Re-Ed 08802:       Therapeutic Activity 04492:      Gait Training 75163:       Manual Therapy 58801:   DN 50mm to L Greater troch, glut med, piriformis in supine x 5 min DNP  Hip jt mobs, long axis distraction, distraction and posterior glides.   Modalities:         HEP / Access Codes:   Access Code: [email or web address]  URL: [email or web address]  Date: 11/13/2024  Prepared by: Robe Davalos    Exercises  - Side Stepping with Resistance at Ankles  - 1 x  "daily - 3 x weekly - 3 sets - 10 reps  - Hooklying Isometric Hip Flexion  - 1 x daily - 3 x weekly - 2 sets - 12 reps  - Supine Bridge  - 1 x daily - 3 x weekly - 2 sets - 12 reps  - Standing Hip Extension and Flexion with Resistance  - 1 x daily - 2-3 x weekly - 2 sets - 12 reps  - Serbian Deadlift With Barbell  - 1 x daily - 2-3 x weekly - 3 sets - 10 reps    Assessment: Tolerated session well. Working hard.  No pain with any exercises.        Post-Treatment Symptoms:      0/10  Plan: avoid the \"dull ache/tightness\" and progress core and hip strength and stability          Goals:   Active       PT goals       STG's       Start:  11/06/24    Expected End:  12/21/24       STG 1: Patient will be IND with LLE groin/HS flexibility and hip mobilization HEP with band x 3 visits         LTG's       Start:  11/06/24    Expected End:  02/04/25       LTG 1: Patient will report no groin pain x 2 weeks  LTG 2: Patient will demonstrate 0-110 L hip flexion AROM for improved LLE flexibility  LTG 3: Patient will demonstrate 5/5 L hip strength.           "

## 2024-12-10 ENCOUNTER — TREATMENT (OUTPATIENT)
Dept: PHYSICAL THERAPY | Facility: CLINIC | Age: 34
End: 2024-12-10
Payer: COMMERCIAL

## 2024-12-10 DIAGNOSIS — M25.552 LEFT HIP PAIN: ICD-10-CM

## 2024-12-10 DIAGNOSIS — M53.3 SI (SACROILIAC) JOINT DYSFUNCTION: ICD-10-CM

## 2024-12-10 DIAGNOSIS — S76.012A STRAIN OF FLEXOR MUSCLE OF LEFT HIP, INITIAL ENCOUNTER: ICD-10-CM

## 2024-12-10 DIAGNOSIS — M21.70 LEG LENGTH DISCREPANCY: ICD-10-CM

## 2024-12-10 DIAGNOSIS — S76.012A MUSCLE STRAIN OF GLUTEAL REGION, LEFT, INITIAL ENCOUNTER: ICD-10-CM

## 2024-12-10 DIAGNOSIS — M25.852 LEFT HIP IMPINGEMENT SYNDROME: ICD-10-CM

## 2024-12-10 DIAGNOSIS — S39.012A LUMBAR STRAIN, INITIAL ENCOUNTER: ICD-10-CM

## 2024-12-10 PROCEDURE — 97110 THERAPEUTIC EXERCISES: CPT | Mod: GP

## 2024-12-10 PROCEDURE — 97140 MANUAL THERAPY 1/> REGIONS: CPT | Mod: GP

## 2024-12-10 NOTE — PROGRESS NOTES
Physical Therapy Treatment     Patient Name: Valdo Ernst  MRN: [id number]  Encounter date: 12/10/2024    Time Calculation  Start Time: 0745  End time: 0825  Therapeutic exercise x 10 minutes  Manual time entry x 30 minutes     Visit # 5 of 12  Visits/Dates Authorized: 11/5/24:  [employer] - NO AUTH / $3400 DEDUCT not met / $7000 OOP not met / 80% Coins / 60 DAYS/YR - 0 USED / PER [employer] W/S / REF: [id number] / ds       Current Problem:   Problem List Items Addressed This Visit               ICD-10-CM     Left hip pain M25.552     Muscle strain of gluteal region, left, initial encounter S76.012A     Strain of flexor muscle of left hip S76.012A     SI (sacroiliac) joint dysfunction M53.3     Lumbar strain, initial encounter S39.012A     Left hip impingement syndrome M25.852     Leg length discrepancy M21.70               Precautions:    none     Pain: 0     Subjective  Still having intermittent pain L hip with squatting.  No current pain      Objective  Findings: Hyper mobility L hip.   Treatments:      Therapeutic Exercise 77781:   Elypitcal L 5 x x 10 min     Manual Therapy 76594:   Hip jt mobs, long axis distraction, distraction and posterior glides x 30 minutes    Modalities:   DNP     HEP / Access Codes:   Access Code: [email or web address]  URL: [email or web address]  Date: 11/13/2024  Prepared by: Robe Davalos     Exercises  - Side Stepping with Resistance at Ankles  - 1 x daily - 3 x weekly - 3 sets - 10 reps  - Hooklying Isometric Hip Flexion  - 1 x daily - 3 x weekly - 2 sets - 12 reps  - Supine Bridge  - 1 x daily - 3 x weekly - 2 sets - 12 reps  - Standing Hip Extension and Flexion with Resistance  - 1 x daily - 2-3 x weekly - 2 sets - 12 reps  - Telugu Deadlift With Barbell  - 1 x daily - 2-3 x weekly - 3 sets - 10 reps     Assessment:  Duration/intensity of L hip mobs increased today to evaluate ability to reduce pain with squatting.  Patient tolerates.  Will continue with manual emphasis and patient  understanding exercise principles/HEP.      Post-Treatment Symptoms:   0/10    Plan: Focus on manual, decompressive hip mobs     Goals:   Active         PT goals         STG's         Start:  11/06/24    Expected End:  12/21/24        STG 1: Patient will be IND with LLE groin/HS flexibility and hip mobilization HEP with band x 3 visits           LTG's         Start:  11/06/24    Expected End:  02/04/25        LTG 1: Patient will report no groin pain x 2 weeks  LTG 2: Patient will demonstrate 0-110 L hip flexion AROM for improved LLE flexibility  LTG 3: Patient will demonstrate 5/5 L hip strength.

## 2024-12-19 ENCOUNTER — APPOINTMENT (OUTPATIENT)
Dept: PHYSICAL THERAPY | Facility: CLINIC | Age: 34
End: 2024-12-19
Payer: COMMERCIAL

## 2024-12-24 ENCOUNTER — TREATMENT (OUTPATIENT)
Dept: PHYSICAL THERAPY | Facility: CLINIC | Age: 34
End: 2024-12-24
Payer: COMMERCIAL

## 2024-12-24 DIAGNOSIS — M25.852 LEFT HIP IMPINGEMENT SYNDROME: ICD-10-CM

## 2024-12-24 DIAGNOSIS — M53.3 SI (SACROILIAC) JOINT DYSFUNCTION: ICD-10-CM

## 2024-12-24 DIAGNOSIS — S76.012A STRAIN OF FLEXOR MUSCLE OF LEFT HIP, INITIAL ENCOUNTER: ICD-10-CM

## 2024-12-24 DIAGNOSIS — M21.70 LEG LENGTH DISCREPANCY: ICD-10-CM

## 2024-12-24 DIAGNOSIS — M25.552 LEFT HIP PAIN: ICD-10-CM

## 2024-12-24 DIAGNOSIS — S39.012A LUMBAR STRAIN, INITIAL ENCOUNTER: ICD-10-CM

## 2024-12-24 DIAGNOSIS — S76.012A MUSCLE STRAIN OF GLUTEAL REGION, LEFT, INITIAL ENCOUNTER: ICD-10-CM

## 2024-12-24 PROCEDURE — 97110 THERAPEUTIC EXERCISES: CPT | Mod: GP

## 2024-12-24 PROCEDURE — 97140 MANUAL THERAPY 1/> REGIONS: CPT | Mod: GP

## 2024-12-24 NOTE — PROGRESS NOTES
Physical Therapy Treatment     Patient Name: Valdo Ernst  MRN: [id number]  Encounter date: 12/24/2024    Time Calculation  Start Time: 0700  End time: 0740  Therapeutic exercise x 10 minutes  Manual time entry x 30 minutes     Visit # 6 of 12  Visits/Dates Authorized: 11/5/24:  [employer] - NO AUTH / $3400 DEDUCT not met / $7000 OOP not met / 80% Coins / 60 DAYS/YR - 0 USED / PER [employer] W/S / REF: [id number] / ds       Current Problem:   Problem List Items Addressed This Visit               ICD-10-CM     Left hip pain M25.552     Muscle strain of gluteal region, left, initial encounter S76.012A     Strain of flexor muscle of left hip S76.012A     SI (sacroiliac) joint dysfunction M53.3     Lumbar strain, initial encounter S39.012A     Left hip impingement syndrome M25.852     Leg length discrepancy M21.70               Precautions:    none     Pain: 0     Subjective  Last visit 12/10.  L hip about the same.  No major exacerbations.  Feels pain with lowered depth of split squat movement.      Objective  Findings: Hyper mobility L hip.   Treatments:      Therapeutic Exercise 14435:   Elypitcal L 5 x x 10 min      Manual Therapy 35485:   Hip jt mobs, long axis distraction, distraction and posterior glides x 30 minutes     Modalities:   DNP     HEP / Access Codes:   Access Code: [email or web address]  URL: [email or web address]  Date: 11/13/2024  Prepared by: Robe Davalos     Exercises  - Side Stepping with Resistance at Ankles  - 1 x daily - 3 x weekly - 3 sets - 10 reps  - Hooklying Isometric Hip Flexion  - 1 x daily - 3 x weekly - 2 sets - 12 reps  - Supine Bridge  - 1 x daily - 3 x weekly - 2 sets - 12 reps  - Standing Hip Extension and Flexion with Resistance  - 1 x daily - 2-3 x weekly - 2 sets - 12 reps  - Thai Deadlift With Barbell  - 1 x daily - 2-3 x weekly - 3 sets - 10 reps     Assessment:  Continued with aggressive hip mobilization in an attempt to decompress L hip and continue to aid with pain control.  Patient  reports not yet being back to 100% baseline but overall pain control good.  Still having some pain with certain athletic activity.      Post-Treatment Symptoms:   0/10     Plan: Focus on manual, decompressive hip mobs     Goals:   Active         PT goals         STG's         Start:  11/06/24    Expected End:  12/21/24        STG 1: Patient will be IND with LLE groin/HS flexibility and hip mobilization HEP with band x 3 visits           LTG's         Start:  11/06/24    Expected End:  02/04/25        LTG 1: Patient will report no groin pain x 2 weeks  LTG 2: Patient will demonstrate 0-110 L hip flexion AROM for improved LLE flexibility  LTG 3: Patient will demonstrate 5/5 L hip strength.

## 2024-12-27 ENCOUNTER — TREATMENT (OUTPATIENT)
Dept: PHYSICAL THERAPY | Facility: CLINIC | Age: 34
End: 2024-12-27
Payer: COMMERCIAL

## 2024-12-27 DIAGNOSIS — M21.70 LEG LENGTH DISCREPANCY: ICD-10-CM

## 2024-12-27 DIAGNOSIS — S39.012A LUMBAR STRAIN, INITIAL ENCOUNTER: ICD-10-CM

## 2024-12-27 DIAGNOSIS — S76.012A MUSCLE STRAIN OF GLUTEAL REGION, LEFT, INITIAL ENCOUNTER: ICD-10-CM

## 2024-12-27 DIAGNOSIS — M53.3 SI (SACROILIAC) JOINT DYSFUNCTION: ICD-10-CM

## 2024-12-27 DIAGNOSIS — M25.552 LEFT HIP PAIN: ICD-10-CM

## 2024-12-27 DIAGNOSIS — M25.852 LEFT HIP IMPINGEMENT SYNDROME: ICD-10-CM

## 2024-12-27 DIAGNOSIS — S76.012A STRAIN OF FLEXOR MUSCLE OF LEFT HIP, INITIAL ENCOUNTER: ICD-10-CM

## 2024-12-27 PROCEDURE — 97140 MANUAL THERAPY 1/> REGIONS: CPT | Mod: GP

## 2024-12-27 NOTE — PROGRESS NOTES
Physical Therapy Treatment     Patient Name: Valdo Ernst  MRN: [id number]  Encounter date: 12/27/2024    Time Calculation  Start Time: 0745  End time: 0825  Therapeutic exercise x 0 inutes  Manual time entry x 40 minutes     Visit # 7 of 12  Visits/Dates Authorized: 11/5/24:  [employer] - NO AUTH / $3400 DEDUCT not met / $7000 OOP not met / 80% Coins / 60 DAYS/YR - 0 USED / PER [employer] W/S / REF: [id number] / ds       Current Problem:   Problem List Items Addressed This Visit               ICD-10-CM     Left hip pain M25.552     Muscle strain of gluteal region, left, initial encounter S76.012A     Strain of flexor muscle of left hip S76.012A     SI (sacroiliac) joint dysfunction M53.3     Lumbar strain, initial encounter S39.012A     Left hip impingement syndrome M25.852     Leg length discrepancy M21.70               Precautions:    none     Pain: 0     Subjective  No changes.  Pain remains controlled but gets discomfort with certain squatting and lunging positions.      Objective  + L hip pain with L hip IR end AROM    Treatments:      Therapeutic Exercise 14951:   Elypitcal L 5 x x 10 min      Manual Therapy 52995:   Hip jt mobs, long axis distraction, distraction and posterior glides x 30 minutes     Modalities:   DNP     HEP / Access Codes:   Access Code: [email or web address]  URL: [email or web address]  Date: 11/13/2024  Prepared by: Robe Davalos     Exercises  - Side Stepping with Resistance at Ankles  - 1 x daily - 3 x weekly - 3 sets - 10 reps  - Hooklying Isometric Hip Flexion  - 1 x daily - 3 x weekly - 2 sets - 12 reps  - Supine Bridge  - 1 x daily - 3 x weekly - 2 sets - 12 reps  - Standing Hip Extension and Flexion with Resistance  - 1 x daily - 2-3 x weekly - 2 sets - 12 reps  - Ukrainian Deadlift With Barbell  - 1 x daily - 2-3 x weekly - 3 sets - 10 reps     Assessment:  Progressing appropriately.  Continued treatment with consistent dosage of L hip mobilization for decompression in an attempt to provide better  pain relief over time with athletic and work out activity.      Post-Treatment Symptoms:   0/10     Plan: Focus on manual, decompressive hip mobs     Goals:   Active         PT goals         STG's         Start:  11/06/24    Expected End:  12/21/24        STG 1: Patient will be IND with LLE groin/HS flexibility and hip mobilization HEP with band x 3 visits           LTG's         Start:  11/06/24    Expected End:  02/04/25        LTG 1: Patient will report no groin pain x 2 weeks  LTG 2: Patient will demonstrate 0-110 L hip flexion AROM for improved LLE flexibility  LTG 3: Patient will demonstrate 5/5 L hip strength.

## 2024-12-30 ENCOUNTER — APPOINTMENT (OUTPATIENT)
Dept: PHYSICAL THERAPY | Facility: CLINIC | Age: 34
End: 2024-12-30
Payer: COMMERCIAL

## 2025-01-14 NOTE — PROGRESS NOTES
Verbal consent of the patient and/or verbal parental consent for patients under the age of 18 have been obtained to conduct a physical examination at this office visit.    Established patient  History Of Present Illness  01/15/25 Valdo Ernst is a 34 y.o. male who presents for an evaluation of their Left HIP and lower back. States that he continues to have 4/10 hip pain as well as lower back pain. States that he just finished physical therapy over the past 6 to 8 weeks with Abhishek Traore and that he believes there may be a labral tear in the left hip.  Additionally he has been having ongoing lower back pain off and on for years.  States that he has altered his workouts and stopped dead lifting and squatting and the pain has continued. States that he primarily feels tightness pain and irritation in the groin as well as the posterior hip. States this issue has been ongoing for approximately 2 years. States that he only takes ibuprofen alternating with Tylenol prn but it usually does not help.   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  He says that he recalls years ago when he first started dating his wife that he was hurrying to get through a workout and felt an excruciating pain in his lower back and left hip what felt like a ripping sensation and at that time he limped around and had issues for a couple days and then since that time over the past couple years it has been a constant issue off and on with his lower back and his left hip.  He was referred into the office by physical therapy for ongoing workup and further imaging because of not getting any better with treatments.    All  previous Progress Notes and imaging results related to this patients chief complaint have been reviewed in preparation for this examination.    Past Medical History  He has no past medical history on file.    Surgical History  He has a past surgical history that includes Other surgical history (10/29/2021) and Vasectomy.     Social History  He reports that he has never smoked. He has never used smokeless tobacco. He reports current alcohol use. He reports that he does not use drugs.    Family History  Family History   Problem Relation Name Age of Onset    Crohn's disease Father       History Of Present Illness  10/18/24 Valdo Ernst is a 33 y.o. male presenting with LEFT hip pain. Pt owns a deck building company.  Pt states that a majority of his past injuries have been on his LEFT side. In the past year, he has tried to go away from strength training and in to more stretching. Pain has worsened within the past month that he is in today for further evaluation. Pain in the sciatica, wrapping in to lateral aspect of his hip in to his groin and down his leg. Notes tightness and pain in all movement. Pain is to the point of it waking him up. Pain with all range of motion. Denies any numbness or tingling. Pt has tried: foam rolling, stretching, soft tissue massage, heat, ice, NSAIDs as needed. Rates current pain as a 0/10 but with activity and daily activities a 8/10.  We discussed treatment options.  Upon exam patient has indications of an SI joint dysfunction as well as a upper glutes strain and low back strain.  Patient also has a leg length discrepancy noted on exam.  As such after discussion we will order x-rays of the lower back and hips and pelvis to evaluate for leg length discrepancy or other occult pathology.  Patient will start into physical therapy as soon as he is able.  Advised patient to look into getting insoles and wearing supportive footwear.  Patient can follow-up in 6 weeks and we can reevaluate  at that time with consideration of more advanced imaging such as MRI or CT is indicated.  Patient verbalizes understanding and agreement with plan of care.     Allergies  Patient has no known allergies.    Review of Systems  CONSTITUTIONAL:   Negative for weight change, loss of appetite, fatigue, weakness, fever, chills, night sweats, headaches .           HEENT:   Negative for cold, cough, sore throat, sinus pain, swollen lymph nodes.           OPHTHALMOLOGY:   Negative for diminished vision, blurred vision, loss of vision, double vision.           ALLERGY:   Negative for runny nose, scratchy throat, sinus congestion, rash, facial pressure, nasal congestion, post-nasal drip.           CARDIOLOGY:   Negative for chest pain, palpitations, murmurs, irregular heart beat, shortness of breath, leg edema, dyspnea on exertion, fatigue, dizziness.           RESPIRATORY:   Negative for chest pain, shortness of breath, swelling of the legs, asthma/copd, chest congestion, pain with breathing .           GASTROENTEROLOGY:   Negative for nausea, vomitting, heartburn, constipation, diarrhea, blood in stool, change in bowel habits, black stool.           HEMATOLOGY/LYMPH:   Negative for fatigue, loss of appetitie, easy bruising, easy bleeding, anemia, abnormal bleeding, slow healing.           ENDOCRINOLOGY:   Negative for polyuria, polydipsia, polyphagia, fatigue, weight loss, weight gain, cold intolerance, heat intolerance, diabetes.           MUSCULOSKELETAL:   Positive  for Left HIP Pain        DERMATOLOGY:   Negative for rash, bruising.           NEUROLOGY:   Negative for tingling, numbness, gait abnormality, paresthesias, weakness, sciatica.        Examination:  Left Hip   Edema: Negative   Ecchymosis/Bruising: Negative.   Percussion Test: Negative.   Tuning Fork Test: Negative.   Orientation: Symmetrical.      ROM:   Positive Internal Rotation (30-35 degrees) decreased, and causes pain  Positive External Rotation (45-60  degrees) decreased,and causes pain  Flexion (120 degrees)   Extension (15-30 degrees)   ABduction (45-50 degrees)   Positive ADduction (20-30 degrees) decreased, causes pain             Muscle Strength:   +5/+5 Hamstring Flexion  +5/+5 Quadricep Extension         +5/+5 Hip Flexion   +5/+5Hip Extension                   +5/+5 Hip ABduction away from body   +5/+5 Hip ADduction towards body           +5/+5 Hip Internal Rotation at 90 Degrees   +5/+5 Hip External Rotation at 90 Degrees                   +5/+5 Hip Internal Rotation at 0 Degrees   +5/+5 Hip External Rotation at 0 Degrees      DTR/Neurological:  Sensation Intact, 2-Point Discrimination: Negative.            Palpation:  Positive  tender to palpation over Left Hip Flexor and ADDuctor(s) as well as IT band           Vascular:   Negative: Normal Pulses   +2/+4, Femoral Artery, DP, PT  Capillary Refill < 2 seconds.            Function Tests:  Test for Rectus Femoris Contracture: Positive  Hip Extension Test: Positive    Iliotibial Tract Test: Positive    Mark Anthony :  Positive    Mark Anthony Test:  Positive    Steve Test: Positive              Hip/Pelvis - Flexibility:  Hamstring Positive   Hip Flexor  Positive    IT-Band  Positive           Hip/Pelvis - Hip Contractures:  Finger Tip Test: Negative.   Mark Anthony Test: Negative.   Mark Anthony  Test: Negative.   Piriformis Test: Negative.      Hip/Pelvis - Hip Dysplasia:  Trochanter Irritation Sign: Negative.   Galeazzi Test: Negative.   Kalchschmidt Test: Negative.            Hip/Pelvis - Impingement/Labrum:  GAVIN Test: Positive            FADIR Test:   Positive  MORE THAN GAVIN    Hip Scouring Test:  Positive        Mcdaniel's Test:  Positive        Posterior Labrum (Posterior Margin) Test:  Negative      Posterior Impingement (Posterior Labrum) [Torque] Test: Negative    Anterior Impingement (Anterior Labrum) Test: Negative   Psoas Sign:   Negative               Hip/Pelvis - IT Band Syndrome:  Steve's (Iliotibial Tract)  Test: Positive    Mark Compression Test: Positive    J-Sign: Negative.            Hip/Pelvis - SFE:  Drehmann Test: Negative.            Hip/Pelvis - Trochanteric/Pelvis Muscle Function:  Trendelenburg/Duchenne Sign: Negative.   Duchenne Sign: Negative.       Leg Length:  Leg Length Supine: Positive right leg shorter than left leg on physical examination however x-rays show left leg is the actual true short leg   leg Length Supine to Seated (Derbolowsky Sign):  Positive right leg shorter than left leg on physical examination however x-rays show left leg is the actual true short leg    ---------------------------------------------------  Examination:  Left Lumbar Spine  Edema: Negative.   TART Findings: Positive  Tenderness Right SI joint and additionally lower lumbar spine articular pillars  Ecchymosis/Bruising: Negative.   Percussion Test (LUMBAR): Negative.   Tuning Fork Test (LUMBAR): Negative.   Percussion (Sacrum): Negative.   Tuning Fork (Sacrum): Negative.      Orientation:  Orientation (LUMBAR):Positive decreased lumbar lordosis due to muscle spasms  Orientation (Sacrum): Negative      ROM (LUMBAR):   Negative      Sacrum:  Standing Flexion Test: Positive Left  Seated Flexion Test: Positive Left  Spring Test: Negative   Sacral Somatic Dysfunction: Positive Right rotation on right axis  Hip Flexor Tightness: Positive left greater than right  Hamstring Tightness: Positive   right greater than left        Muscle Strength:   +5/+5 Hamstring Flexion  +5/+5 Quadricep Extension  +5/+5 Hip Flexion  +5/+5 Hip Extension  +5/+5 Hip ABduction toward body  +5/+5 Hip ADduction away from body  +5/+5 Hip Internal Rotation at 90 Degrees  +5/+5 Hip External Rotation at 90 Degrees  +5/+5 Hip Internal Rotation at 0 Degrees  +5/+5 Hip External Rotation at 0 Degrees.            DTR/Neurological: 2-Point Discrimination:  Negative,Toe Walk normal,Heel Walk normal   +2/+4 Patellar Reflex (L-4)  +2/+4 Posterior Tibialis and Medial  Hamstrings Reflex (L5)  +2/+4 Achilles Reflex (S-1).            Sensation/Neurological Lumbar:   Positive decreased    L1: Low back, hips, and groinPositive decreased on left  Negative L2: Low back and front of inside of upper leg/thigh  Negative L3: Low back and front of upper leg/thigh   L4: Low back, front of upper leg/thigh, front of lower leg/calf, front of medial area of knee, and inside of ankle Positive decreased on left    L5: Low back, front and lateral knee, front and outside of lower leg/calf, top and bottom of foot and first four toes especially big toe. Positive decreased on left            Sensation/Neurological Sacrum:   Positive decreased    S1: low back, back of upper leg/thigh, back and inside of lower leg, calf and little toe Positive decreased on left   Negative S2: Buttocks, genitals, back of upper leg/thigh and calves  Negative S3: Buttocks and genitals  Negative S4: Buttocks  Negative S5: Buttocks.      Sensation/Neurological Coccygeal:   Negative Sensation Intact, 2-Point Discrimination:    Negative Coccyx: Buttocks and area of tailbone.      Palpation: Positive  Tender to Palpation over the right SI Joint as well as lower lumbar spine articular pillars           Vascular:   Capillary Refill < 2 seconds  +2/+4Carotid  +2/+4 Dorsalis Pedis  +2/+4 Posterior Tibial.                Low Back-Disc Injury:  Valsalva Maneuver: Negative.   Fermoral Nerve Traction Test: Positive  left refers to left side and right refers to middle and left side.     Slump Test:Positive left refers to left side; and right refers to middle and left side.   Cross Test Seated: Positive  Laying Straight Leg Raise: Positive left refers to left side; and right refers to middle and left side.   Laseague Sign: Negative.   Laseague Differential Test: Negative.   Laseague Drop Test: Negative.   Seated Laseague Test: Negative.    Reverse Laseague Test: Negative.   Tip Toe Heel Walking Test: Negative.   Jose Prone Knee Flexion  Test: Negative.   Multifidus Toe Touch Test (MT3): Negative.  Prone Instability Test (PIT): Negative  Multifidus Lift Test (MLT): Negative       Low Back-SI Joint:  Three-Phase Hyperextension Test: Negative.    Yeoman Test: Negative.   Sacroilliac Stress Test: Negative.   Abduction Stress Test: Negative.           Low Back-Spondy:  Stork Test: Positive left.   Sphinx Test: Positive mostly after activity  Modified Sphinx Test: Positive     Feet/Foot:   Positive BILATERAL Valgus foot      Imaging and Diagnostics Review:  Left leg shorter than the Right leg by the following  Iliac Crest 6 mm   Sacral Base 5 mm  Femoral heads 5 mm   Median difference of Left leg shorter than the Right leg is 5.33 mm    XR HIP LEFT WITH PELVIS WHEN PERFORMED 2 OR 3 VIEWS      INDICATION:  Signs/Symptoms:LEFT HIP PAIN.      COMPARISON:  None      ACCESSION NUMBER(S):  [id number]      ORDERING CLINICIAN:  AKSHAT AYERS      FINDINGS:  No evidence of fracture. Small os acetabula. Joint space normal.  Small bone island femoral neck.      IMPRESSION:  No acute findings left hip      Signed by: Bob Shelton 10/19/2024 8:30 AM  -----------------------------------------------  XR lumbar spine 2-3 views  Narrative & Impression   Interpreted By:  Bob Shelton,   STUDY:  XR LUMBAR SPINE 2-3 VIEWS      INDICATION:  Signs/Symptoms:LOW BACK PAIN.      COMPARISON:  None      ACCESSION NUMBER(S):  [id number]      ORDERING CLINICIAN:  AKSHAT AYERS      FINDINGS:  Minimal L4-S1 facet arthrosis      Mild scoliosis.      Mild discogenic degenerative disease of the thoracolumbar junction.      No evidence of fracture.      IMPRESSION:  Minimal L4-S1 facet arthrosis  2. Mild scoliosis.  3. Mild discogenic degenerative disease of the thoracolumbar junction.      Signed by: Bob Shelton 10/19/2024 8:30 AM  Dictation workstation:   [id number]   -*---------------------------------------------------------  XR hip left with pelvis when performed 2 or 3  views  Narrative & Impression   Interpreted By:  Bob Shelton,   STUDY:  XR HIP LEFT WITH PELVIS WHEN PERFORMED 2 OR 3 VIEWS      INDICATION:  Signs/Symptoms:LEFT HIP PAIN.      COMPARISON:  None      ACCESSION NUMBER(S):  [id number]      ORDERING CLINICIAN:  AKSHAT AYERS      FINDINGS:  No evidence of fracture. Small os acetabula. Joint space normal.  Small bone island femoral neck.      IMPRESSION:  No acute findings left hip      Signed by: Bob Shelton 10/19/2024 8:30 AM  Dictation workstation:   [id number]       Assessment   1. Osteoarthritis of spine with radiculopathy, lumbar region        2. Left hip pain  MR arthrogram hip left    XR arthrogram hip left      3. Sprain, gluteus medius, left, subsequent encounter  MR arthrogram hip left    XR arthrogram hip left      4. Strain of flexor muscle of left hip, subsequent encounter  MR arthrogram hip left    XR arthrogram hip left      5. SI (sacroiliac) joint dysfunction  MR arthrogram hip left    XR arthrogram hip left      6. Lumbar spine strain, subsequent encounter  MR lumbar spine w and wo IV contrast      7. Leg length discrepancy        8. Left hip impingement syndrome  MR arthrogram hip left    XR arthrogram hip left      9. Piriformis syndrome of left side  MR arthrogram hip left    XR arthrogram hip left    MR lumbar spine w and wo IV contrast      10. Discitis of lumbosacral region  MR lumbar spine w and wo IV contrast      11. Sacral region somatic dysfunction  MR arthrogram hip left    XR arthrogram hip left    MR lumbar spine w and wo IV contrast      12. Degenerative tear of acetabular labrum of left hip        13. Strain of lumbar paraspinal muscle, initial encounter  MR lumbar spine w and wo IV contrast      14. Left hip flexor tightness        15. Hamstring tightness of both lower extremities            Treatment or Intervention:  May continue to alternate ice and moist heat therapy as needed.  After we get the MRI of the lumbar spine  and MR arthrogram we will start back into physical Therapy 1-2 times a week for 8-10 weeks with manual therapy as well as dry needling and IASTM  Stressed the importance of wearing shoes with good stability control to help with the biomechanics affecting the lower legs  Stressed the importance of wearing full foot insoles to help with the biomechanics affecting the lower legs  At next visit recommendation over-the-counter  vitamin-D3 0152-0560+ units a day with food as well as a daily multivitamin  At next visit recommendation over-the-counter Move Free for joint health.  May alternate Advil liquid gels 2-3 every 8 hours with food and take OTC Tylenol Extra Strength or OTC Tylenol Arthritis, taking one every 6-8 hours with food as needed for pain management.  Patient advised regarding the risk and/or potential adverse reactions and/or side effects of any prescribed medications along with any over-the-counter medications or any supplements used. Patient advised to seek immediate medical care if any adverse reactions occur. The patient and/or patient(s) parent(s) verbalized their understanding.  Discussed in detail with the patient to the level of their understanding the possibility in the future of regenerative injections versus corticosteroids injections versus viscosupplementation injections  At next office visit we will give 6 mm heel lift to place into the left shoe to accommodate leg length discrepancy found on standing erect pelvis x-ray  MRI lumbar spine rule out herniated disc versus stress fracture versus other  MR arthrogram of the left hip to rule out labral tear versus other  You have been ordered an MR Arthrogram of the left hip and MRI of the Lumbar spine. Once you contact scheduling at [phone] and obtain the date and time of your MR Arthrogram and MRI, contact our office at [phone] to schedule your follow-up appointment to review your results. Please note the results of your imaging will  not be discussed over the telephone.   Follow up after MRI lumbar spine and MR arthrogram of the left hip    Please note that this report has been produced using speech recognition software.  It may contain errors related to grammar, punctuation or spelling.  Electronically signed, but not reviewed.  GISSEL Brown, Director of Sports Medicine    NAKUL NELSON on 1/15/25 at 10:57 AM.     LENORE Brown DO

## 2025-01-15 ENCOUNTER — OFFICE VISIT (OUTPATIENT)
Dept: SPORTS MEDICINE | Facility: CLINIC | Age: 35
End: 2025-01-15
Payer: COMMERCIAL

## 2025-01-15 ENCOUNTER — APPOINTMENT (OUTPATIENT)
Dept: PHYSICAL THERAPY | Facility: CLINIC | Age: 35
End: 2025-01-15
Payer: COMMERCIAL

## 2025-01-15 VITALS
DIASTOLIC BLOOD PRESSURE: 80 MMHG | HEART RATE: 70 BPM | HEIGHT: 74 IN | BODY MASS INDEX: 29.52 KG/M2 | WEIGHT: 230 LBS | SYSTOLIC BLOOD PRESSURE: 120 MMHG

## 2025-01-15 DIAGNOSIS — M25.852 LEFT HIP IMPINGEMENT SYNDROME: ICD-10-CM

## 2025-01-15 DIAGNOSIS — M47.26 OSTEOARTHRITIS OF SPINE WITH RADICULOPATHY, LUMBAR REGION: Primary | ICD-10-CM

## 2025-01-15 DIAGNOSIS — G57.02 PIRIFORMIS SYNDROME OF LEFT SIDE: ICD-10-CM

## 2025-01-15 DIAGNOSIS — S76.012A MUSCLE STRAIN OF GLUTEAL REGION, LEFT, INITIAL ENCOUNTER: ICD-10-CM

## 2025-01-15 DIAGNOSIS — S76.012D SPRAIN, GLUTEUS MEDIUS, LEFT, SUBSEQUENT ENCOUNTER: ICD-10-CM

## 2025-01-15 DIAGNOSIS — M21.70 LEG LENGTH DISCREPANCY: ICD-10-CM

## 2025-01-15 DIAGNOSIS — M99.04 SACRAL REGION SOMATIC DYSFUNCTION: ICD-10-CM

## 2025-01-15 DIAGNOSIS — M25.552 LEFT HIP PAIN: ICD-10-CM

## 2025-01-15 DIAGNOSIS — M53.3 SI (SACROILIAC) JOINT DYSFUNCTION: ICD-10-CM

## 2025-01-15 DIAGNOSIS — S39.012A LUMBAR STRAIN, INITIAL ENCOUNTER: ICD-10-CM

## 2025-01-15 DIAGNOSIS — S39.012D LUMBAR SPINE STRAIN, SUBSEQUENT ENCOUNTER: ICD-10-CM

## 2025-01-15 DIAGNOSIS — M24.152 DEGENERATIVE TEAR OF ACETABULAR LABRUM OF LEFT HIP: ICD-10-CM

## 2025-01-15 DIAGNOSIS — S39.012A STRAIN OF LUMBAR PARASPINAL MUSCLE, INITIAL ENCOUNTER: ICD-10-CM

## 2025-01-15 DIAGNOSIS — M62.9 HAMSTRING TIGHTNESS OF BOTH LOWER EXTREMITIES: ICD-10-CM

## 2025-01-15 DIAGNOSIS — M24.552 LEFT HIP FLEXOR TIGHTNESS: ICD-10-CM

## 2025-01-15 DIAGNOSIS — S76.012D STRAIN OF FLEXOR MUSCLE OF LEFT HIP, SUBSEQUENT ENCOUNTER: ICD-10-CM

## 2025-01-15 DIAGNOSIS — M46.47 DISCITIS OF LUMBOSACRAL REGION: ICD-10-CM

## 2025-01-15 DIAGNOSIS — S76.012A STRAIN OF FLEXOR MUSCLE OF LEFT HIP, INITIAL ENCOUNTER: ICD-10-CM

## 2025-01-15 PROBLEM — M41.126 ADOLESCENT IDIOPATHIC SCOLIOSIS OF LUMBAR REGION: Status: ACTIVE | Noted: 2025-01-15

## 2025-01-15 PROCEDURE — 99215 OFFICE O/P EST HI 40 MIN: CPT | Performed by: FAMILY MEDICINE

## 2025-01-15 PROCEDURE — 97110 THERAPEUTIC EXERCISES: CPT | Mod: GP

## 2025-01-15 PROCEDURE — 3008F BODY MASS INDEX DOCD: CPT | Performed by: FAMILY MEDICINE

## 2025-01-15 PROCEDURE — 97140 MANUAL THERAPY 1/> REGIONS: CPT | Mod: GP

## 2025-01-15 PROCEDURE — 1036F TOBACCO NON-USER: CPT | Performed by: FAMILY MEDICINE

## 2025-01-15 ASSESSMENT — PAIN DESCRIPTION - DESCRIPTORS: DESCRIPTORS: ACHING;SORE

## 2025-01-15 ASSESSMENT — PAIN SCALES - GENERAL
PAINLEVEL_OUTOF10: 4
PAINLEVEL_OUTOF10: 4

## 2025-01-15 ASSESSMENT — PATIENT HEALTH QUESTIONNAIRE - PHQ9
2. FEELING DOWN, DEPRESSED OR HOPELESS: NOT AT ALL
1. LITTLE INTEREST OR PLEASURE IN DOING THINGS: NOT AT ALL
SUM OF ALL RESPONSES TO PHQ9 QUESTIONS 1 AND 2: 0

## 2025-01-15 ASSESSMENT — ENCOUNTER SYMPTOMS
DEPRESSION: 0
LOSS OF SENSATION IN FEET: 0
OCCASIONAL FEELINGS OF UNSTEADINESS: 0

## 2025-01-15 ASSESSMENT — PAIN - FUNCTIONAL ASSESSMENT: PAIN_FUNCTIONAL_ASSESSMENT: 0-10

## 2025-01-15 ASSESSMENT — COLUMBIA-SUICIDE SEVERITY RATING SCALE - C-SSRS
1. IN THE PAST MONTH, HAVE YOU WISHED YOU WERE DEAD OR WISHED YOU COULD GO TO SLEEP AND NOT WAKE UP?: NO
6. HAVE YOU EVER DONE ANYTHING, STARTED TO DO ANYTHING, OR PREPARED TO DO ANYTHING TO END YOUR LIFE?: NO
2. HAVE YOU ACTUALLY HAD ANY THOUGHTS OF KILLING YOURSELF?: NO

## 2025-01-15 NOTE — PROGRESS NOTES
Physical Therapy Treatment     Patient Name: Valdo Ernst  MRN: [id number]  Encounter date: 1/15/2024    Time Calculation  Start Time: 0700  End time: 0740  Therapeutic exercise x 10 inutes  Manual time entry x 30 minutes     Visit # 8 of 12  Visits/Dates Authorized: 11/5/24:  [employer] - NO AUTH / $3400 DEDUCT not met / $7000 OOP not met / 80% Coins / 60 DAYS/YR - 0 USED / PER [employer] W/S / REF: [id number] / ds       Current Problem:   Problem List Items Addressed This Visit               ICD-10-CM     Left hip pain M25.552     Muscle strain of gluteal region, left, initial encounter S76.012A     Strain of flexor muscle of left hip S76.012A     SI (sacroiliac) joint dysfunction M53.3     Lumbar strain, initial encounter S39.012A     Left hip impingement syndrome M25.852     Leg length discrepancy M21.70               Precautions:    none     Pain: 0     Subjective  Had time off work. Pain not much better. Feels stiffness L lower back and L hip     Objective  Tight L iliopsoas, adductors, piriformis, L glute med/max     Treatments:      Continued:    Therapeutic Exercise 69294:   Elypitcal L 5 x x 10 min      Manual Therapy 37312:   Hip jt mobs, long axis distraction, distraction and posterior glides x 30 minutes     Modalities:   DNP     HEP / Access Codes:   Access Code: [email or web address]  URL: [email or web address]  Date: 11/13/2024  Prepared by: Robe Davalos     Exercises  - Side Stepping with Resistance at Ankles  - 1 x daily - 3 x weekly - 3 sets - 10 reps  - Hooklying Isometric Hip Flexion  - 1 x daily - 3 x weekly - 2 sets - 12 reps  - Supine Bridge  - 1 x daily - 3 x weekly - 2 sets - 12 reps  - Standing Hip Extension and Flexion with Resistance  - 1 x daily - 2-3 x weekly - 2 sets - 12 reps  - Eritrean Deadlift With Barbell  - 1 x daily - 2-3 x weekly - 3 sets - 10 reps     Assessment:  Limited overall symptomatic progress.  No active pain but continues to have pain and stiffness with sitting and lifting. To see  Doctor and HOLD with PT until further work up conducted.      Post-Treatment Symptoms:   0/10     Plan: Focus on manual, decompressive hip mobs     Goals:   Active         PT goals         STG's         Start:  11/06/24    Expected End:  12/21/24        STG 1: Patient will be IND with LLE groin/HS flexibility and hip mobilization HEP with band x 3 visits           LTG's         Start:  11/06/24    Expected End:  02/04/25        LTG 1: Patient will report no groin pain x 2 weeks  LTG 2: Patient will demonstrate 0-110 L hip flexion AROM for improved LLE flexibility  LTG 3: Patient will demonstrate 5/5 L hip strength.

## 2025-01-15 NOTE — PATIENT INSTRUCTIONS
May use PRICE therapy as needed.  Start into Physical Therapy 1-2 times a week for 8-10 weeks with manual therapy as well as dry needling and IASTM  Stressed the importance of wearing shoes with good stability control to help with the biomechanics affecting the lower legs  Stressed the importance of wearing full foot insoles to help with the biomechanics affecting the lower legs  Recommendation over-the-counter calcium 500mg, 3 times a day with vitamin-D3 0835-7076+ units a day with food as well as a daily multivitamin  Recommendation over-the-counter Move Free for joint health.  May take OTC Tylenol Extra Strength or OTC Tylenol Arthritis, taking one every 6-8 hours with food as needed for pain management.  Patient advised regarding the risk and/or potential adverse reactions and/or side effects of any prescribed medications along with any over-the-counter medications or any supplements used. Patient advised to seek immediate medical care if any adverse reactions occur. The patient and/or patient(s) parent(s) verbalized their understanding.  Discussed in detail with the patient to the level of their understanding the possibility in the future of regenerative injections versus corticosteroids injections  Possibility in future of MRI of LUMBAR spine to rule out disc herniation vs fracture vs other. MSK to read  You have been ordered an MR Arthrogram of the left hip and MRI of the Lumbar spine. Once you contact scheduling at [phone] and obtain the date and time of your MR Arthrogram and MRI, contact our office at [phone] to schedule your follow-up appointment to review your results. Please note the results of your imaging will not be discussed over the telephone.   Follow up after MRI

## 2025-01-21 ENCOUNTER — TELEPHONE (OUTPATIENT)
Dept: SPORTS MEDICINE | Facility: CLINIC | Age: 35
End: 2025-01-21
Payer: COMMERCIAL

## 2025-01-21 NOTE — TELEPHONE ENCOUNTER
Joellen from  [employer] called and asked about a response to the [employer] recommendation for alternate options for order or if a peer to peer needs to be completed and if it has been set up. Please call back to inform of decision.    [phone]

## 2025-01-24 ENCOUNTER — APPOINTMENT (OUTPATIENT)
Dept: RADIOLOGY | Facility: CLINIC | Age: 35
End: 2025-01-24
Payer: COMMERCIAL

## 2025-02-03 ENCOUNTER — APPOINTMENT (OUTPATIENT)
Dept: RADIOLOGY | Facility: CLINIC | Age: 35
End: 2025-02-03
Payer: COMMERCIAL

## 2025-02-07 NOTE — PROGRESS NOTES
Verbal consent of the patient and/or verbal parental consent for patients under the age of 18 have been obtained to conduct a physical examination at this office visit.    Established patient  History Of Present Illness  02/10/25 Valdo Ernst is a 34 y.o. male who presents MRI review of their Left HIP and lower back. States that he continues to have 6/10 pain in the left hip. He has not yet been able to get a left hip MRI. States that he continues to have the same pains as before. He continues to go to physical therapy. States that when he works and is bending over picking up items his back hurts worse but when walking and twisting he feels more pain in his left hip.  He explains that his insurance will cover an MR Arthrogram for the left hip, however wants it to be completed at an outside facility. He has reached out to the approved facilities and none of them are able to complete an MR Arthrogram.   Still the recommendation before his next step would be to get an MRI arthrogram of the hip since he is having both hip and back issues.  I told him most likely referral pain going down his leg is coming from his back and the issues getting into his groin is from his labrum especially after his physical examination.  Will try to once again do another peer to peer which is confusing because when I did it last time I asked about the hip and they said that he was approved to go wherever and then all of a sudden they said that he could not go to a hospital facility.  He is called around to everyplace around and no one has the MR arthrograms except at the hospital.  The only other option would be a 3T MRI but once again it is unknown if insurance would approve this either I told him I would do another peer to peer if necessary and we will try to get a hold of them.  I also recommended he gets a hold of Dr. Gallardo ahead of time for epidural injections to at least help his back.  Also that we will give him a back brace  that he can use while he is at work.  Once again he continues on with NSAIDs and Tylenol and that did not seem to help so I told him we are going to give him some oral steroids as well as some injections of steroid today to jumpstart stuff.  Also physical therapy he is significantly plateaued and has not been getting any better over the past 3 to 4 months.    All previous Progress Notes and imaging results related to this patients chief complaint have been reviewed in preparation for this examination.    Past Medical History  He has no past medical history on file.    Surgical History  He has a past surgical history that includes Other surgical history (10/29/2021) and Vasectomy.     Social History  He reports that he has never smoked. He has never used smokeless tobacco. He reports current alcohol use. He reports that he does not use drugs.    Family History  Family History   Problem Relation Name Age of Onset    Crohn's disease Father       History Of Present Illness  10/18/24 Valdo Ernst is a 33 y.o. male presenting with LEFT hip pain. Pt owns a deck building company.  Pt states that a majority of his past injuries have been on his LEFT side. In the past year, he has tried to go away from strength training and in to more stretching. Pain has worsened within the past month that he is in today for further evaluation. Pain in the sciatica, wrapping in to lateral aspect of his hip in to his groin and down his leg. Notes tightness and pain in all movement. Pain is to the point of it waking him up. Pain with all range of motion. Denies any numbness or tingling. Pt has tried: foam rolling, stretching, soft tissue massage, heat, ice, NSAIDs as needed. Rates current pain as a 0/10 but with activity and daily activities a 8/10.  We discussed treatment options.  Upon exam patient has indications of an SI joint dysfunction as well as a upper glutes strain and low back strain.  Patient also has a leg length discrepancy  noted on exam.  As such after discussion we will order x-rays of the lower back and hips and pelvis to evaluate for leg length discrepancy or other occult pathology.  Patient will start into physical therapy as soon as he is able.  Advised patient to look into getting insoles and wearing supportive footwear.  Patient can follow-up in 6 weeks and we can reevaluate at that time with consideration of more advanced imaging such as MRI or CT is indicated.  Patient verbalizes understanding and agreement with plan of care.     Allergies  Patient has no known allergies.    Review of Systems  CONSTITUTIONAL:   Negative for weight change, loss of appetite, fatigue, weakness, fever, chills, night sweats, headaches .           HEENT:   Negative for cold, cough, sore throat, sinus pain, swollen lymph nodes.           OPHTHALMOLOGY:   Negative for diminished vision, blurred vision, loss of vision, double vision.           ALLERGY:   Negative for runny nose, scratchy throat, sinus congestion, rash, facial pressure, nasal congestion, post-nasal drip.           CARDIOLOGY:   Negative for chest pain, palpitations, murmurs, irregular heart beat, shortness of breath, leg edema, dyspnea on exertion, fatigue, dizziness.           RESPIRATORY:   Negative for chest pain, shortness of breath, swelling of the legs, asthma/copd, chest congestion, pain with breathing .           GASTROENTEROLOGY:   Negative for nausea, vomitting, heartburn, constipation, diarrhea, blood in stool, change in bowel habits, black stool.           HEMATOLOGY/LYMPH:   Negative for fatigue, loss of appetitie, easy bruising, easy bleeding, anemia, abnormal bleeding, slow healing.           ENDOCRINOLOGY:   Negative for polyuria, polydipsia, polyphagia, fatigue, weight loss, weight gain, cold intolerance, heat intolerance, diabetes.           MUSCULOSKELETAL:   Positive  for Left HIP Pain        DERMATOLOGY:   Negative for rash, bruising.           NEUROLOGY:    Negative for tingling, numbness, gait abnormality, paresthesias, weakness, sciatica.        Examination: All findings unchanged since last visit and all the physical therapy  Left Hip   Edema: Negative   Ecchymosis/Bruising: Negative.   Percussion Test: Negative.   Tuning Fork Test: Negative.   Orientation: Symmetrical.      ROM:   All findings unchanged since last visit and all the physical therapy  Left Hip   Positive Internal Rotation (30-35 degrees) decreased, and causes pain  Positive External Rotation (45-60 degrees) decreased,and causes pain  Flexion (120 degrees)   Extension (15-30 degrees)   ABduction (45-50 degrees)   Positive ADduction (20-30 degrees) decreased, causes pain             Muscle Strength:   +5/+5 Hamstring Flexion  +5/+5 Quadricep Extension         +5/+5 Hip Flexion   +5/+5Hip Extension                   +5/+5 Hip ABduction away from body   +5/+5 Hip ADduction towards body           +5/+5 Hip Internal Rotation at 90 Degrees   +5/+5 Hip External Rotation at 90 Degrees                   +5/+5 Hip Internal Rotation at 0 Degrees   +5/+5 Hip External Rotation at 0 Degrees      DTR/Neurological:  Sensation Intact, 2-Point Discrimination: Negative.            Palpation: All findings unchanged since last visit and all the physical therapy  Left Hip   Positive  tender to palpation over Left Hip Flexor and ADDuctor(s) as well as IT band           Vascular:   Negative: Normal Pulses   +2/+4, Femoral Artery, DP, PT  Capillary Refill < 2 seconds.            Function Tests: All findings unchanged since last visit and all the physical therapy  Left Hip   Test for Rectus Femoris Contracture: Positive  Hip Extension Test: Positive    Iliotibial Tract Test: Positive    Mark Anthony :  Positive    Mark Anthony Test:  Positive    Steve Test: Positive              Hip/Pelvis - Flexibility: All findings unchanged since last visit and all the physical therapy  Left Hip   Hamstring Positive   Hip Flexor  Positive     IT-Band  Positive           Hip/Pelvis - Hip Contractures:  Finger Tip Test: Negative.   Mark Anthony Test: Negative.   Mark Anthony  Test: Negative.   Piriformis Test: Negative.      Hip/Pelvis - Hip Dysplasia:  Trochanter Irritation Sign: Negative.   Galeazzi Test: Negative.   Kalchschmidt Test: Negative.            Hip/Pelvis - Impingement/Labrum: All findings unchanged since last visit and all the physical therapy  Left Hip   GAVIN Test: Positive            FADIR Test:   Positive  MORE THAN GAVIN    Hip Scouring Test:  Positive        Mcdaniel's Test:  Positive        Posterior Labrum (Posterior Margin) Test:  Negative      Posterior Impingement (Posterior Labrum) [Torque] Test: Negative    Anterior Impingement (Anterior Labrum) Test: Negative   Psoas Sign:   Negative               Hip/Pelvis - IT Band Syndrome: All findings unchanged since last visit and all the physical therapy  Left Hip   Steve's (Iliotibial Tract) Test: Positive    Mark Compression Test: Positive    J-Sign: Negative.            Hip/Pelvis - SFE:  Drehmann Test: Negative.            Hip/Pelvis - Trochanteric/Pelvis Muscle Function:  Trendelenburg/Duchenne Sign: Negative.   Duchenne Sign: Negative.       Leg Length:  Leg Length Supine: Positive right leg shorter than left leg on physical examination however x-rays show left leg is the actual true short leg   leg Length Supine to Seated (Derbolowsky Sign):  Positive right leg shorter than left leg on physical examination however x-rays show left leg is the actual true short leg    ---------------------------------------------------  Examination: All findings unchanged since last visit and all the physical therapy  Left Hip   Left Lumbar Spine  Edema: Negative.   TART Findings: Positive  Tenderness Right SI joint and additionally lower lumbar spine articular pillars  Ecchymosis/Bruising: Negative.   Percussion Test (LUMBAR): Negative.   Tuning Fork Test (LUMBAR): Negative.   Percussion (Sacrum):  Negative.   Tuning Fork (Sacrum): Negative.      Orientation: All findings unchanged since last visit and all the physical therapy  Left Hip   Orientation (LUMBAR):Positive decreased lumbar lordosis due to muscle spasms All findings unchanged since last visit and all the physical therapy  Left Hip   Orientation (Sacrum): Negative      ROM (LUMBAR):    Positive decreased with flexion and extension     Sacrum:  Standing Flexion Test: Positive Left  Seated Flexion Test: Positive Left  Spring Test: Negative   Sacral Somatic Dysfunction: Positive Right rotation on right axis  Hip Flexor Tightness: Positive left greater than right  Hamstring Tightness: Positive   right greater than left        Muscle Strength:   +5/+5 Hamstring Flexion  +5/+5 Quadricep Extension  +5/+5 Hip Flexion  +5/+5 Hip Extension  +5/+5 Hip ABduction toward body  +5/+5 Hip ADduction away from body  +5/+5 Hip Internal Rotation at 90 Degrees  +5/+5 Hip External Rotation at 90 Degrees  +5/+5 Hip Internal Rotation at 0 Degrees  +5/+5 Hip External Rotation at 0 Degrees.            DTR/Neurological: 2-Point Discrimination:  Negative,Toe Walk normal,Heel Walk normal   +2/+4 Patellar Reflex (L-4)  +2/+4 Posterior Tibialis and Medial Hamstrings Reflex (L5)  +2/+4 Achilles Reflex (S-1).            Sensation/Neurological Lumbar: All findings unchanged since last visit and all the physical therapy  Positive decreased    L1: Low back, hips, and groinPositive decreased on left  Negative L2: Low back and front of inside of upper leg/thigh  L3: Low back and front of upper leg/thigh Positive decreased on left   L4: Low back, front of upper leg/thigh, front of lower leg/calf, front of medial area of knee, and inside of ankle Positive decreased on left    L5: Low back, front and lateral knee, front and outside of lower leg/calf, top and bottom of foot and first four toes especially big toe. Positive decreased on left            Sensation/Neurological Sacrum: All  findings unchanged since last visit and all the physical therapy  Positive decreased    S1: low back, back of upper leg/thigh, back and inside of lower leg, calf and little toe Positive decreased on left   Negative S2: Buttocks, genitals, back of upper leg/thigh and calves  Negative S3: Buttocks and genitals  Negative S4: Buttocks  Negative S5: Buttocks.      Sensation/Neurological Coccygeal:   Negative Sensation Intact, 2-Point Discrimination:    Negative Coccyx: Buttocks and area of tailbone.      Palpation: Positive  Tender to Palpation over the right SI Joint as well as lower lumbar spine articular pillarsAll findings unchanged since last visit and all the physical therapy           Vascular:   Capillary Refill < 2 seconds  +2/+4Carotid  +2/+4 Dorsalis Pedis  +2/+4 Posterior Tibial.                Low Back-Disc Injury:All findings unchanged since last visit and all the physical therapy  Valsalva Maneuver: Negative.   Fermoral Nerve Traction Test: Positive  left refers to left side and right refers to middle and left side.     Slump Test:Positive left refers to left side; and right refers to middle and left side.   Cross Test Seated: Positive  Laying Straight Leg Raise: Positive left refers to left side; and right refers to middle and left side.   Laseague Sign: Negative.   Laseague Differential Test: Negative.   Laseague Drop Test: Negative.   Seated Laseague Test: Negative.    Reverse Laseague Test: Negative.   Tip Toe Heel Walking Test: Negative.   Jose Prone Knee Flexion Test: Negative.   Multifidus Toe Touch Test (MT3): Negative.  Prone Instability Test (PIT): Negative  Multifidus Lift Test (MLT): Negative       Low Back-SI Joint:  Three-Phase Hyperextension Test: Negative.    Yeoman Test: Negative.   Sacroilliac Stress Test: Negative.   Abduction Stress Test: Negative.           Low Back-Spondy:All findings unchanged since last visit and all the physical therapy  Stork Test: Positive left.   Sphinx Test:  Positive mostly after activity  Modified Sphinx Test: Positive     Feet/Foot:   Positive BILATERAL Valgus foot      Imaging and Diagnostics Review:  Left leg shorter than the Right leg by the following  Iliac Crest 6 mm   Sacral Base 5 mm  Femoral heads 5 mm   Median difference of Left leg shorter than the Right leg is 5.33 mm    XR HIP LEFT WITH PELVIS WHEN PERFORMED 2 OR 3 VIEWS      INDICATION:  Signs/Symptoms:LEFT HIP PAIN.      COMPARISON:  None      ACCESSION NUMBER(S):  [id number]      ORDERING CLINICIAN:  AKSHAT AYERS      FINDINGS:  No evidence of fracture. Small os acetabula. Joint space normal.  Small bone island femoral neck.      IMPRESSION:  No acute findings left hip      Signed by: Bob Shelton 10/19/2024 8:30 AM  -----------------------------------------------  MR Lumbar Spine ([facility])  02/06/2025  IMPRESSION:  Disc Disease of the T12-S1 level and the lumbar spine as described above.  T12-S1 multilevel disc bulges  Disc annular tear L4-L5 and L5-S1  Neural foraminal stenosis L3-L4 moderate left; L4-L5 moderate left, mild right; L5-S1 Mild right  Facet hypertrophy L3-S1  ------------------------------------------------  XR lumbar spine 2-3 views  Narrative & Impression   Interpreted By:  Bob Shelton,   STUDY:  XR LUMBAR SPINE 2-3 VIEWS      INDICATION:  Signs/Symptoms:LOW BACK PAIN.      COMPARISON:  None      ACCESSION NUMBER(S):  [id number]      ORDERING CLINICIAN:  AKSHAT AYERS      FINDINGS:  Minimal L4-S1 facet arthrosis      Mild scoliosis.      Mild discogenic degenerative disease of the thoracolumbar junction.      No evidence of fracture.      IMPRESSION:  Minimal L4-S1 facet arthrosis  2. Mild scoliosis.  3. Mild discogenic degenerative disease of the thoracolumbar junction.      Signed by: Bob Shelton 10/19/2024 8:30 AM  Dictation workstation:   [id number]   -*---------------------------------------------------------  XR hip left with pelvis when performed 2 or 3  views  Narrative & Impression   Interpreted By:  Bob Shelton,   STUDY:  XR HIP LEFT WITH PELVIS WHEN PERFORMED 2 OR 3 VIEWS      INDICATION:  Signs/Symptoms:LEFT HIP PAIN.      COMPARISON:  None      ACCESSION NUMBER(S):  [id number]      ORDERING CLINICIAN:  AKSHAT AYERS      FINDINGS:  No evidence of fracture. Small os acetabula. Joint space normal.  Small bone island femoral neck.      IMPRESSION:  No acute findings left hip      Signed by: Bob Shelton 10/19/2024 8:30 AM  Dictation workstation:   [id number]       Assessment   1. Osteoarthritis of lumbar spine with myelopathy        2. Sprain, gluteus medius, left, subsequent encounter  Referral to Physical Therapy    Referral to Physical Medicine Rehab    methylPREDNISolone sodium succinate (PF) (SOLU-Medrol) injection 125 mg    methylPREDNISolone acetate (DEPO-Medrol) injection 40 mg    ketorolac (Toradol) injection 30 mg    predniSONE (Deltasone) 20 mg tablet    cyclobenzaprine (Flexeril) 10 mg tablet      3. Left hip pain  Referral to Physical Therapy    Referral to Physical Medicine Rehab    methylPREDNISolone sodium succinate (PF) (SOLU-Medrol) injection 125 mg    methylPREDNISolone acetate (DEPO-Medrol) injection 40 mg    ketorolac (Toradol) injection 30 mg    predniSONE (Deltasone) 20 mg tablet    cyclobenzaprine (Flexeril) 10 mg tablet      4. Strain of flexor muscle of left hip, subsequent encounter  Referral to Physical Therapy    Referral to Physical Medicine Rehab    methylPREDNISolone sodium succinate (PF) (SOLU-Medrol) injection 125 mg    methylPREDNISolone acetate (DEPO-Medrol) injection 40 mg    ketorolac (Toradol) injection 30 mg    predniSONE (Deltasone) 20 mg tablet    cyclobenzaprine (Flexeril) 10 mg tablet      5. SI (sacroiliac) joint dysfunction  Referral to Physical Therapy    Referral to Physical Medicine Rehab    methylPREDNISolone sodium succinate (PF) (SOLU-Medrol) injection 125 mg    methylPREDNISolone acetate (DEPO-Medrol)  injection 40 mg    ketorolac (Toradol) injection 30 mg    predniSONE (Deltasone) 20 mg tablet    cyclobenzaprine (Flexeril) 10 mg tablet      6. Lumbar spine strain, subsequent encounter  Referral to Physical Therapy    Referral to Physical Medicine Rehab    methylPREDNISolone sodium succinate (PF) (SOLU-Medrol) injection 125 mg    methylPREDNISolone acetate (DEPO-Medrol) injection 40 mg    ketorolac (Toradol) injection 30 mg    predniSONE (Deltasone) 20 mg tablet    cyclobenzaprine (Flexeril) 10 mg tablet      7. Leg length discrepancy  Referral to Physical Therapy    Referral to Physical Medicine Rehab    methylPREDNISolone sodium succinate (PF) (SOLU-Medrol) injection 125 mg    methylPREDNISolone acetate (DEPO-Medrol) injection 40 mg    ketorolac (Toradol) injection 30 mg    predniSONE (Deltasone) 20 mg tablet    cyclobenzaprine (Flexeril) 10 mg tablet      8. Left hip impingement syndrome  Referral to Physical Therapy    Referral to Physical Medicine Rehab    methylPREDNISolone sodium succinate (PF) (SOLU-Medrol) injection 125 mg    methylPREDNISolone acetate (DEPO-Medrol) injection 40 mg    ketorolac (Toradol) injection 30 mg    predniSONE (Deltasone) 20 mg tablet    cyclobenzaprine (Flexeril) 10 mg tablet      9. Piriformis syndrome of left side  Referral to Physical Therapy    Referral to Physical Medicine Rehab    methylPREDNISolone sodium succinate (PF) (SOLU-Medrol) injection 125 mg    methylPREDNISolone acetate (DEPO-Medrol) injection 40 mg    ketorolac (Toradol) injection 30 mg    predniSONE (Deltasone) 20 mg tablet    cyclobenzaprine (Flexeril) 10 mg tablet      10. Discitis of lumbosacral region  Referral to Physical Therapy    Referral to Physical Medicine Rehab    methylPREDNISolone sodium succinate (PF) (SOLU-Medrol) injection 125 mg    methylPREDNISolone acetate (DEPO-Medrol) injection 40 mg    ketorolac (Toradol) injection 30 mg    predniSONE (Deltasone) 20 mg tablet    cyclobenzaprine (Flexeril)  10 mg tablet      11. Sacral region somatic dysfunction  Referral to Physical Therapy    Referral to Physical Medicine Rehab    methylPREDNISolone sodium succinate (PF) (SOLU-Medrol) injection 125 mg    methylPREDNISolone acetate (DEPO-Medrol) injection 40 mg    ketorolac (Toradol) injection 30 mg    predniSONE (Deltasone) 20 mg tablet    cyclobenzaprine (Flexeril) 10 mg tablet      12. Degenerative tear of acetabular labrum of left hip  Referral to Physical Therapy    Referral to Physical Medicine Rehab    methylPREDNISolone sodium succinate (PF) (SOLU-Medrol) injection 125 mg    methylPREDNISolone acetate (DEPO-Medrol) injection 40 mg    ketorolac (Toradol) injection 30 mg    predniSONE (Deltasone) 20 mg tablet    cyclobenzaprine (Flexeril) 10 mg tablet      13. Strain of lumbar paraspinal muscle, initial encounter  Referral to Physical Therapy    Referral to Physical Medicine Rehab    methylPREDNISolone sodium succinate (PF) (SOLU-Medrol) injection 125 mg    methylPREDNISolone acetate (DEPO-Medrol) injection 40 mg    ketorolac (Toradol) injection 30 mg    predniSONE (Deltasone) 20 mg tablet    cyclobenzaprine (Flexeril) 10 mg tablet      14. Left hip flexor tightness  Referral to Physical Therapy    Referral to Physical Medicine Rehab    methylPREDNISolone sodium succinate (PF) (SOLU-Medrol) injection 125 mg    methylPREDNISolone acetate (DEPO-Medrol) injection 40 mg    ketorolac (Toradol) injection 30 mg    predniSONE (Deltasone) 20 mg tablet    cyclobenzaprine (Flexeril) 10 mg tablet      15. Osteoarthritis of spine with radiculopathy, lumbar region  Referral to Physical Therapy    Referral to Physical Medicine Rehab    methylPREDNISolone sodium succinate (PF) (SOLU-Medrol) injection 125 mg    methylPREDNISolone acetate (DEPO-Medrol) injection 40 mg    ketorolac (Toradol) injection 30 mg    predniSONE (Deltasone) 20 mg tablet    cyclobenzaprine (Flexeril) 10 mg tablet      16. Hamstring tightness of both lower  extremities  Referral to Physical Therapy    Referral to Physical Medicine Rehab    methylPREDNISolone sodium succinate (PF) (SOLU-Medrol) injection 125 mg    methylPREDNISolone acetate (DEPO-Medrol) injection 40 mg    ketorolac (Toradol) injection 30 mg    predniSONE (Deltasone) 20 mg tablet    cyclobenzaprine (Flexeril) 10 mg tablet      17. Bulging of lumbar intervertebral disc        18. Annular tear of lumbar disc        19. Neural foraminal stenosis of lumbosacral spine        20. Facet hypertrophy of lumbosacral region              Treatment or Intervention:  May continue to alternate ice and moist heat therapy as needed.  Continue physical Therapy 1-2 times a week for 8-10 weeks with manual therapy as well as dry needling and IASTM  Once again stressed the importance of wearing shoes with good stability control to help with the biomechanics affecting the lower legs  Once again stressed the importance of wearing full foot insoles to help with the biomechanics affecting the lower legs  Continue over-the-counter  vitamin-D3 3169-8005+ units a day with food as well as a daily multivitamin  Continue over-the-counter Move Free for joint health.  Can alternate between prednisone taper OTC Tylenol Extra Strength or OTC Tylenol Arthritis, taking one every 6-8 hours with food as needed for pain management.  Patient was given a Lumbar Support brace for their low back injury/condition. The patient is ambulatory with or without aid and feels more stable with the brace on. The brace definitely helps improve their function as well as stability. Verbal and written instructions for the use, wear schedule, cleaning, and application of this brace were given. Patient was instructed that should the brace result in increased pain, decreased sensation, numbness and/or tingling, increased swelling, or an overall worsening of their medical condition; to please contact our office immediately. Orthotic/brace management and training  was provided for skin care, modifications due to healing tissues, edema changes, interruption in skin integrity, and safety precautions with the Orthosis/brace.   TREATMENT PLAN: Patient received IM injection of SoluMedrol 125 milligrams at the time of this office visit. , Patient received IM injection of Toradol 60 milligrams at the time of this office visit., and Patient received IM injection of DepoMedrol 40 milligrams at the time of this office visit.  10 DAY TAPER:  The following prescription was electronically sent to the patient's pharmacy of record: Prednisone 20mg, take 4 tablets a day x 4 days, 3 tabletss a day x 3 days, 2 tablets a day x 2 days, and 1 tablet a day x 1 day with food; start tomorrow if received Solu-Medrol injection in the office at visit, otherwise start immediately, dispense quantity sufficient, with no refills     The following prescription was electronically sent to the patient's pharmacy of record: Flexeril (Cyclobenzaprine) 10 milligrams, may take 1 tablet 1-2 hours before bedtime as needed for muscle relaxation, Duration: 30 days, Dispense: #30, Refill: 0   Patient advised regarding the risk and/or potential adverse reactions and/or side effects of any prescribed medications along with any over-the-counter medications or any supplements used. Patient advised to seek immediate medical care if any adverse reactions occur. The patient and/or patient(s) parent(s) verbalized their understanding.  Discussed in detail with the patient to the level of their understanding the possibility in the future of regenerative injections versus corticosteroids injections versus viscosupplementation injections  Once again recommendation MR arthrogram of the left hip  Referral to Dr. Gallardo for epidural injection  Recommendation inversion table  Patient given 6 mm heel lift to place in the left shoe to accommodate leg length discrepancy  Follow-up after MRI arthrogram of the left hip    Please note that  this report has been produced using speech recognition software.  It may contain errors related to grammar, punctuation or spelling.  Electronically signed, but not reviewed.  GISSEL Brown, Director of Sports Medicine    NAKUL NELSON on 2/10/25 at 9:58 AM.     LENORE Brown DO

## 2025-02-10 ENCOUNTER — OFFICE VISIT (OUTPATIENT)
Dept: SPORTS MEDICINE | Facility: CLINIC | Age: 35
End: 2025-02-10
Payer: COMMERCIAL

## 2025-02-10 VITALS
SYSTOLIC BLOOD PRESSURE: 120 MMHG | WEIGHT: 230 LBS | BODY MASS INDEX: 29.52 KG/M2 | HEART RATE: 70 BPM | HEIGHT: 74 IN | DIASTOLIC BLOOD PRESSURE: 80 MMHG

## 2025-02-10 DIAGNOSIS — M48.07 NEURAL FORAMINAL STENOSIS OF LUMBOSACRAL SPINE: ICD-10-CM

## 2025-02-10 DIAGNOSIS — M24.552 LEFT HIP FLEXOR TIGHTNESS: ICD-10-CM

## 2025-02-10 DIAGNOSIS — M62.9 HAMSTRING TIGHTNESS OF BOTH LOWER EXTREMITIES: ICD-10-CM

## 2025-02-10 DIAGNOSIS — M51.369 BULGING OF LUMBAR INTERVERTEBRAL DISC: ICD-10-CM

## 2025-02-10 DIAGNOSIS — M99.04 SACRAL REGION SOMATIC DYSFUNCTION: ICD-10-CM

## 2025-02-10 DIAGNOSIS — S39.012A STRAIN OF LUMBAR PARASPINAL MUSCLE, INITIAL ENCOUNTER: ICD-10-CM

## 2025-02-10 DIAGNOSIS — M53.3 SI (SACROILIAC) JOINT DYSFUNCTION: ICD-10-CM

## 2025-02-10 DIAGNOSIS — M47.16 OSTEOARTHRITIS OF LUMBAR SPINE WITH MYELOPATHY: Primary | ICD-10-CM

## 2025-02-10 DIAGNOSIS — S76.012D SPRAIN, GLUTEUS MEDIUS, LEFT, SUBSEQUENT ENCOUNTER: ICD-10-CM

## 2025-02-10 DIAGNOSIS — M25.552 LEFT HIP PAIN: ICD-10-CM

## 2025-02-10 DIAGNOSIS — S76.012D STRAIN OF FLEXOR MUSCLE OF LEFT HIP, SUBSEQUENT ENCOUNTER: ICD-10-CM

## 2025-02-10 DIAGNOSIS — M46.47 DISCITIS OF LUMBOSACRAL REGION: ICD-10-CM

## 2025-02-10 DIAGNOSIS — M25.852 LEFT HIP IMPINGEMENT SYNDROME: ICD-10-CM

## 2025-02-10 DIAGNOSIS — M47.26 OSTEOARTHRITIS OF SPINE WITH RADICULOPATHY, LUMBAR REGION: ICD-10-CM

## 2025-02-10 DIAGNOSIS — M51.369 ANNULAR TEAR OF LUMBAR DISC: ICD-10-CM

## 2025-02-10 DIAGNOSIS — G57.02 PIRIFORMIS SYNDROME OF LEFT SIDE: ICD-10-CM

## 2025-02-10 DIAGNOSIS — S39.012D LUMBAR SPINE STRAIN, SUBSEQUENT ENCOUNTER: ICD-10-CM

## 2025-02-10 DIAGNOSIS — M47.817 FACET HYPERTROPHY OF LUMBOSACRAL REGION: ICD-10-CM

## 2025-02-10 DIAGNOSIS — M24.152 DEGENERATIVE TEAR OF ACETABULAR LABRUM OF LEFT HIP: ICD-10-CM

## 2025-02-10 DIAGNOSIS — M21.70 LEG LENGTH DISCREPANCY: ICD-10-CM

## 2025-02-10 PROBLEM — M47.816 DEGENERATIVE JOINT DISEASE (DJD) OF LUMBAR SPINE: Status: ACTIVE | Noted: 2025-02-10

## 2025-02-10 PROCEDURE — 1036F TOBACCO NON-USER: CPT | Performed by: FAMILY MEDICINE

## 2025-02-10 PROCEDURE — 2500000004 HC RX 250 GENERAL PHARMACY W/ HCPCS (ALT 636 FOR OP/ED): Mod: JZ | Performed by: FAMILY MEDICINE

## 2025-02-10 PROCEDURE — 99214 OFFICE O/P EST MOD 30 MIN: CPT | Performed by: FAMILY MEDICINE

## 2025-02-10 PROCEDURE — L0641 LO RIG POS PNL L1-L5 PRE OTS: HCPCS | Performed by: FAMILY MEDICINE

## 2025-02-10 PROCEDURE — 96372 THER/PROPH/DIAG INJ SC/IM: CPT | Performed by: FAMILY MEDICINE

## 2025-02-10 PROCEDURE — 99214 OFFICE O/P EST MOD 30 MIN: CPT | Mod: 25 | Performed by: FAMILY MEDICINE

## 2025-02-10 PROCEDURE — 3008F BODY MASS INDEX DOCD: CPT | Performed by: FAMILY MEDICINE

## 2025-02-10 RX ORDER — PREDNISONE 20 MG/1
TABLET ORAL
Qty: 30 TABLET | Refills: 0 | Status: SHIPPED | OUTPATIENT
Start: 2025-02-10 | End: 2025-02-20

## 2025-02-10 RX ORDER — METHYLPREDNISOLONE ACETATE 40 MG/ML
40 INJECTION, SUSPENSION INTRA-ARTICULAR; INTRALESIONAL; INTRAMUSCULAR; SOFT TISSUE ONCE
Status: COMPLETED | OUTPATIENT
Start: 2025-02-10 | End: 2025-02-10

## 2025-02-10 RX ORDER — METHYLPREDNISOLONE SODIUM SUCCINATE 125 MG/2ML
125 INJECTION INTRAMUSCULAR; INTRAVENOUS ONCE
Status: COMPLETED | OUTPATIENT
Start: 2025-02-10 | End: 2025-02-10

## 2025-02-10 RX ORDER — KETOROLAC TROMETHAMINE 30 MG/ML
30 INJECTION, SOLUTION INTRAMUSCULAR; INTRAVENOUS ONCE
Status: COMPLETED | OUTPATIENT
Start: 2025-02-10 | End: 2025-02-10

## 2025-02-10 RX ORDER — CYCLOBENZAPRINE HCL 10 MG
10 TABLET ORAL NIGHTLY PRN
Qty: 20 TABLET | Refills: 0 | Status: SHIPPED | OUTPATIENT
Start: 2025-02-10 | End: 2025-03-02

## 2025-02-10 RX ADMIN — KETOROLAC TROMETHAMINE 30 MG: 60 INJECTION, SOLUTION INTRAMUSCULAR at 09:34

## 2025-02-10 RX ADMIN — METHYLPREDNISOLONE SODIUM SUCCINATE 125 MG: 125 INJECTION, POWDER, FOR SOLUTION INTRAMUSCULAR; INTRAVENOUS at 09:35

## 2025-02-10 RX ADMIN — METHYLPREDNISOLONE ACETATE 40 MG: 40 INJECTION, SUSPENSION INTRA-ARTICULAR; INTRALESIONAL; INTRAMUSCULAR; SOFT TISSUE at 09:35

## 2025-02-10 ASSESSMENT — ENCOUNTER SYMPTOMS
LOSS OF SENSATION IN FEET: 0
OCCASIONAL FEELINGS OF UNSTEADINESS: 0
DEPRESSION: 0

## 2025-02-10 ASSESSMENT — PATIENT HEALTH QUESTIONNAIRE - PHQ9
1. LITTLE INTEREST OR PLEASURE IN DOING THINGS: NOT AT ALL
SUM OF ALL RESPONSES TO PHQ9 QUESTIONS 1 AND 2: 0
2. FEELING DOWN, DEPRESSED OR HOPELESS: NOT AT ALL

## 2025-02-10 ASSESSMENT — PAIN SCALES - GENERAL
PAINLEVEL_OUTOF10: 6
PAINLEVEL_OUTOF10: 6

## 2025-02-10 ASSESSMENT — COLUMBIA-SUICIDE SEVERITY RATING SCALE - C-SSRS
1. IN THE PAST MONTH, HAVE YOU WISHED YOU WERE DEAD OR WISHED YOU COULD GO TO SLEEP AND NOT WAKE UP?: NO
2. HAVE YOU ACTUALLY HAD ANY THOUGHTS OF KILLING YOURSELF?: NO
6. HAVE YOU EVER DONE ANYTHING, STARTED TO DO ANYTHING, OR PREPARED TO DO ANYTHING TO END YOUR LIFE?: NO

## 2025-02-10 ASSESSMENT — PAIN - FUNCTIONAL ASSESSMENT: PAIN_FUNCTIONAL_ASSESSMENT: 0-10

## 2025-02-10 NOTE — PATIENT INSTRUCTIONS
May continue to alternate ice and moist heat therapy as needed.  After we get the MRI of the lumbar spine and MR arthrogram we will start back into physical Therapy 1-2 times a week for 8-10 weeks with manual therapy as well as dry needling and IASTM  Stressed the importance of wearing shoes with good stability control to help with the biomechanics affecting the lower legs  Stressed the importance of wearing full foot insoles to help with the biomechanics affecting the lower legs  At next visit recommendation over-the-counter  vitamin-D3 8116-2263+ units a day with food as well as a daily multivitamin  At next visit recommendation over-the-counter Move Free for joint health.  May alternate Advil liquid gels 2-3 every 8 hours with food and take OTC Tylenol Extra Strength or OTC Tylenol Arthritis, taking one every 6-8 hours with food as needed for pain management.  Patient was given a Lumbar Support brace for their low back injury/condition. The patient is ambulatory with or without aid and feels more stable with the brace on. The brace definitely helps improve their function as well as stability. Verbal and written instructions for the use, wear schedule, cleaning, and application of this brace were given. Patient was instructed that should the brace result in increased pain, decreased sensation, numbness and/or tingling, increased swelling, or an overall worsening of their medical condition; to please contact our office immediately. Orthotic/brace management and training was provided for skin care, modifications due to healing tissues, edema changes, interruption in skin integrity, and safety precautions with the Orthosis/brace.   TREATMENT PLAN: Patient received IM injection of SoluMedrol 125 milligrams at the time of this office visit. , Patient received IM injection of Toradol 60 milligrams at the time of this office visit., and Patient received IM injection of DepoMedrol 40 milligrams at the time of this office  visit.  10 DAY TAPER:  The following prescription was electronically sent to the patient's pharmacy of record: Prednisone 20mg, take 4 tablets a day x 4 days, 3 tabletss a day x 3 days, 2 tablets a day x 2 days, and 1 tablet a day x 1 day with food; start tomorrow if received Solu-Medrol injection in the office at visit, otherwise start immediately, dispense quantity sufficient, with no refills     The following prescription was electronically sent to the patient's pharmacy of record: Flexeril (Cyclobenzaprine) 10 milligrams, may take 1 tablet 1-2 hours before bedtime as needed for muscle relaxation, Duration: 30 days, Dispense: #30, Refill: 0   Patient advised regarding the risk and/or potential adverse reactions and/or side effects of any prescribed medications along with any over-the-counter medications or any supplements used. Patient advised to seek immediate medical care if any adverse reactions occur. The patient and/or patient(s) parent(s) verbalized their understanding.  Discussed in detail with the patient to the level of their understanding the possibility in the future of regenerative injections versus corticosteroids injections versus viscosupplementation injections  At next office visit we will give 6 mm heel lift to place into the left shoe to accommodate leg length discrepancy found on standing erect pelvis x-ray

## 2025-02-11 ENCOUNTER — APPOINTMENT (OUTPATIENT)
Dept: RADIOLOGY | Facility: HOSPITAL | Age: 35
End: 2025-02-11
Payer: COMMERCIAL

## 2025-02-18 ENCOUNTER — HOSPITAL ENCOUNTER (OUTPATIENT)
Dept: RADIOLOGY | Facility: HOSPITAL | Age: 35
Discharge: HOME | End: 2025-02-18
Payer: COMMERCIAL

## 2025-02-18 DIAGNOSIS — S76.012D SPRAIN, GLUTEUS MEDIUS, LEFT, SUBSEQUENT ENCOUNTER: ICD-10-CM

## 2025-02-18 DIAGNOSIS — M25.852 LEFT HIP IMPINGEMENT SYNDROME: ICD-10-CM

## 2025-02-18 DIAGNOSIS — M99.04 SACRAL REGION SOMATIC DYSFUNCTION: ICD-10-CM

## 2025-02-18 DIAGNOSIS — M25.552 LEFT HIP PAIN: ICD-10-CM

## 2025-02-18 DIAGNOSIS — S76.012D STRAIN OF FLEXOR MUSCLE OF LEFT HIP, SUBSEQUENT ENCOUNTER: ICD-10-CM

## 2025-02-18 DIAGNOSIS — M53.3 SI (SACROILIAC) JOINT DYSFUNCTION: ICD-10-CM

## 2025-02-18 DIAGNOSIS — G57.02 PIRIFORMIS SYNDROME OF LEFT SIDE: ICD-10-CM

## 2025-02-18 PROCEDURE — 27093 INJECTION FOR HIP X-RAY: CPT | Mod: LEFT SIDE | Performed by: STUDENT IN AN ORGANIZED HEALTH CARE EDUCATION/TRAINING PROGRAM

## 2025-02-18 PROCEDURE — 73722 MRI JOINT OF LWR EXTR W/DYE: CPT | Mod: LEFT SIDE | Performed by: STUDENT IN AN ORGANIZED HEALTH CARE EDUCATION/TRAINING PROGRAM

## 2025-02-18 PROCEDURE — 27093 INJECTION FOR HIP X-RAY: CPT | Mod: LT

## 2025-02-18 PROCEDURE — A9575 INJ GADOTERATE MEGLUMI 0.1ML: HCPCS | Performed by: FAMILY MEDICINE

## 2025-02-18 PROCEDURE — 2550000001 HC RX 255 CONTRASTS: Performed by: FAMILY MEDICINE

## 2025-02-18 PROCEDURE — 77002 NEEDLE LOCALIZATION BY XRAY: CPT | Mod: LEFT SIDE | Performed by: STUDENT IN AN ORGANIZED HEALTH CARE EDUCATION/TRAINING PROGRAM

## 2025-02-18 PROCEDURE — 73525 CONTRAST X-RAY OF HIP: CPT | Mod: LT

## 2025-02-18 RX ORDER — LIDOCAINE HYDROCHLORIDE 20 MG/ML
8 INJECTION, SOLUTION INFILTRATION; PERINEURAL ONCE
Status: DISCONTINUED | OUTPATIENT
Start: 2025-02-18 | End: 2025-02-19 | Stop reason: HOSPADM

## 2025-02-18 RX ORDER — GADOTERATE MEGLUMINE 376.9 MG/ML
0.15 INJECTION INTRAVENOUS
Status: COMPLETED | OUTPATIENT
Start: 2025-02-18 | End: 2025-02-18

## 2025-02-18 RX ADMIN — IOHEXOL 10 ML: 240 INJECTION, SOLUTION INTRATHECAL; INTRAVASCULAR; INTRAVENOUS; ORAL at 14:35

## 2025-02-18 RX ADMIN — GADOTERATE MEGLUMINE 0.15 ML: 376.9 INJECTION INTRAVENOUS at 14:35

## 2025-02-19 NOTE — PROGRESS NOTES
Verbal consent of the patient and/or verbal parental consent for patients under the age of 18 have been obtained to conduct a physical examination at this office visit.    Established patient  History Of Present Illness  02/20/25 Valdo Ernst is a 34 y.o. male who presents MRI review of their Left HIP. 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  He states that he has been more mindful of his posture and works out to help his strength.  He has also been taking prednisone as prescribed.  He states that he will go back to Physical Therapy on March 3rd.  He also has an appointment schedule with Dr. Gallardo next week.  The patient states that his pain is mostly in the left hip and he points right in the anterior superior region of the hip right in the area where his labral tear is located and when it increases he feels it in his back and separately he points in the region of around L4-L5.  He states that today, his pain is 0/10 but when he squats down or bends over to pick something up his pain is 2/10.  Additionally the patient states that he feels like the back brace he was given was too big so we are ordering him a Large Brace to replace it.  I told him I will get a hold of the bracing representative Jani Wynn to get a better fitting brace.  We also talked about regenerative injections for his hip down the line which she is going to discuss with his wife about.  He says also he will be getting back into physical therapy in March 3 and working with Abhishek Traore.  We also talked about modifications for his workouts for using belt squats as well as lunges and other workouts.  Additionally stressed the importance of wearing a lifting belt whenever he is working out as well.  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      All previous Progress Notes and imaging results related to this patients chief complaint have been reviewed in preparation for this examination.    Past Medical History  He has no past medical history on file.    Surgical History  He has a past surgical history that includes Other surgical history (10/29/2021) and Vasectomy.     Social History  He reports that he has never smoked. He has never used smokeless tobacco. He reports current alcohol use. He reports that he does not use drugs.    Family History  Family History   Problem Relation Name Age of Onset    Crohn's disease Father       History Of Present Illness  10/18/24 Valdo Ernst is a 33 y.o. male presenting with LEFT hip pain. Pt owns a deck building company.  Pt states that a majority of his past injuries have been on his LEFT side. In the past year, he has tried to go away from strength training and in to more stretching. Pain has worsened within the past month that he is in today for further evaluation. Pain in the sciatica, wrapping in to lateral aspect of his hip in to his groin and down his leg. Notes tightness and pain in all movement. Pain is to the point of it waking him up. Pain with all range of motion. Denies any numbness or tingling. Pt has tried: foam rolling, stretching, soft tissue massage, heat, ice, NSAIDs as needed. Rates current pain as a 0/10 but with activity and daily activities a 8/10.  We discussed treatment options.  Upon exam patient has indications of an SI joint dysfunction as well as a upper glutes strain and low back strain.  Patient also has a leg length discrepancy noted on exam.  As such after discussion we will order x-rays of the lower back and hips and pelvis to evaluate for leg length discrepancy or other occult pathology.  Patient will start into physical therapy as soon as he is able.  Advised patient to look into getting insoles and wearing supportive footwear.  Patient can follow-up in 6 weeks and  we can reevaluate at that time with consideration of more advanced imaging such as MRI or CT is indicated.  Patient verbalizes understanding and agreement with plan of care.   ----------------------------------------------------------  02/10/25 Valdo Ernst is a 34 y.o. male who presents MRI review of their Lumbar spine. States that he continues to have 6/10 pain in the left hip. He has not yet been able to get a left hip MRI. States that he continues to have the same pains as before. He continues to go to physical therapy. States that when he works and is bending over picking up items his back hurts worse but when walking and twisting he feels more pain in his left hip.  He explains that his insurance will cover an MR Arthrogram for the left hip, however wants it to be completed at an outside facility. He has reached out to the approved facilities and none of them are able to complete an MR Arthrogram.   Still the recommendation before his next step would be to get an MRI arthrogram of the hip since he is having both hip and back issues.  I told him most likely referral pain going down his leg is coming from his back and the issues getting into his groin is from his labrum especially after his physical examination.  Will try to once again do another peer to peer which is confusing because when I did it last time I asked about the hip and they said that he was approved to go wherever and then all of a sudden they said that he could not go to a hospital facility.  He is called around to everyplace around and no one has the MR arthrograms except at the hospital.  The only other option would be a 3T MRI but once again it is unknown if insurance would approve this either I told him I would do another peer to peer if necessary and we will try to get a hold of them.  I also recommended he gets a hold of Dr. Gallardo ahead of time for epidural injections to at least help his back.  Also that we will give him a back brace  that he can use while he is at work.  Once again he continues on with NSAIDs and Tylenol and that did not seem to help so I told him we are going to give him some oral steroids as well as some injections of steroid today to jumpstart stuff.  Also physical therapy he is significantly plateaued and has not been getting any better over the past 3 to 4 months.    Allergies  Patient has no known allergies.    Review of Systems  CONSTITUTIONAL:   Negative for weight change, loss of appetite, fatigue, weakness, fever, chills, night sweats, headaches .           HEENT:   Negative for cold, cough, sore throat, sinus pain, swollen lymph nodes.           OPHTHALMOLOGY:   Negative for diminished vision, blurred vision, loss of vision, double vision.           ALLERGY:   Negative for runny nose, scratchy throat, sinus congestion, rash, facial pressure, nasal congestion, post-nasal drip.           CARDIOLOGY:   Negative for chest pain, palpitations, murmurs, irregular heart beat, shortness of breath, leg edema, dyspnea on exertion, fatigue, dizziness.           RESPIRATORY:   Negative for chest pain, shortness of breath, swelling of the legs, asthma/copd, chest congestion, pain with breathing .           GASTROENTEROLOGY:   Negative for nausea, vomitting, heartburn, constipation, diarrhea, blood in stool, change in bowel habits, black stool.           HEMATOLOGY/LYMPH:   Negative for fatigue, loss of appetitie, easy bruising, easy bleeding, anemia, abnormal bleeding, slow healing.           ENDOCRINOLOGY:   Negative for polyuria, polydipsia, polyphagia, fatigue, weight loss, weight gain, cold intolerance, heat intolerance, diabetes.           MUSCULOSKELETAL:   Positive  for Left HIP Pain        DERMATOLOGY:   Negative for rash, bruising.           NEUROLOGY:   Negative for tingling, numbness, gait abnormality, paresthesias, weakness, sciatica.        Examination: All findings relatively unchanged since last visit   Left Hip    Edema: Negative   Ecchymosis/Bruising: Negative.   Percussion Test: Negative.   Tuning Fork Test: Negative.   Orientation: Symmetrical.      ROM:   All findings relatively unchanged since last visit   Left Hip   Positive Internal Rotation (30-35 degrees) decreased, and causes pain  Positive External Rotation (45-60 degrees) decreased,and causes pain  Flexion (120 degrees)   Extension (15-30 degrees)   ABduction (45-50 degrees)   Positive ADduction (20-30 degrees) decreased, causes pain             Muscle Strength:   +5/+5 Hamstring Flexion  +5/+5 Quadricep Extension         +5/+5 Hip Flexion   +5/+5Hip Extension                   +5/+5 Hip ABduction away from body   +5/+5 Hip ADduction towards body           +5/+5 Hip Internal Rotation at 90 Degrees   +5/+5 Hip External Rotation at 90 Degrees                   +5/+5 Hip Internal Rotation at 0 Degrees   +5/+5 Hip External Rotation at 0 Degrees      DTR/Neurological:    Sensation 2-Point Discrimination: Negative.            Palpation: All findings relatively unchanged since last visit   Left Hip   Positive  tender to palpation over Left Hip Flexor and ADDuctor(s) as well as IT band           Vascular:   Negative: Normal Pulses   +2/+4, Femoral Artery, DP, PT  Capillary Refill < 2 seconds.            Function Tests: All findings relatively unchanged since last visit   Left Hip   Test for Rectus Femoris Contracture: Positive  Hip Extension Test: Positive    Iliotibial Tract Test: Positive    Mark Anthony :  Positive    Mark Anthony Test:  Positive    Steve Test: Positive              Hip/Pelvis - Flexibility: All findings relatively unchanged since last visit   Left Hip   Hamstring Positive   Hip Flexor  Positive    IT-Band  Positive           Hip/Pelvis - Hip Contractures:  Finger Tip Test: Negative.   Mark Anthony Test: Negative.   Mark Anthony  Test: Negative.   Piriformis Test: Negative.      Hip/Pelvis - Hip Dysplasia:  Trochanter Irritation Sign: Negative.   Galeazzi Test:  Negative.   Kalchschmidt Test: Negative.            Hip/Pelvis - Impingement/Labrum: All findings relatively unchanged since last visit   Left Hip   GAVIN Test: Positive            FADIR Test:   Positive  MORE THAN GAVIN    Hip Scouring Test:  Positive        Mcdaniel's Test:  Positive        Posterior Labrum (Posterior Margin) Test:  Negative      Posterior Impingement (Posterior Labrum) [Torque] Test: Negative    Anterior Impingement (Anterior Labrum) Test: Negative   Psoas Sign:   Negative               Hip/Pelvis - IT Band Syndrome: All findings relatively unchanged since last visit   Left Hip   Steve's (Iliotibial Tract) Test: Positive    Mark Compression Test: Positive    J-Sign: Negative.            Hip/Pelvis - SFE:  Drehmann Test: Negative.            Hip/Pelvis - Trochanteric/Pelvis Muscle Function:  Trendelenburg/Duchenne Sign: Negative.   Duchenne Sign: Negative.       Leg Length:  Leg Length Supine: Positive right leg shorter than left leg on physical examination however x-rays show left leg is the actual true short leg   leg Length Supine to Seated (Derbolowsky Sign):  Positive right leg shorter than left leg on physical examination however x-rays show left leg is the actual true short leg    ---------------------------------------------------  Examination:All findings relatively unchanged since last visit   Left Hip   Left Lumbar Spine  Edema: Negative.   TART Findings: Positive  Tenderness Right SI joint and additionally lower lumbar spine articular pillars  Ecchymosis/Bruising: Negative.   Percussion Test (LUMBAR): Negative.   Tuning Fork Test (LUMBAR): Negative.   Percussion (Sacrum): Negative.   Tuning Fork (Sacrum): Negative.      Orientation: All findings relatively unchanged since last visit   Left Hip     Orientation (LUMBAR):Positive decreased lumbar lordosis due to muscle spasmsAll findings relatively unchanged since last visit   Left Hip     Orientation (Sacrum): Negative      ROM (LUMBAR):  All findings relatively unchanged since last visit    Positive decreased with flexion and extension     Sacrum:All findings relatively unchanged since last visit   Standing Flexion Test: Positive Left (decreased pain but reports feeling tension)  Seated Flexion Test: Positive Left(decreased pain but reports feeling tension)  Spring Test: Negative   Sacral Somatic Dysfunction: Positive Right rotation on right axis  Hip Flexor Tightness: Positive left greater than right  Hamstring Tightness: Positive   right greater than left        Muscle Strength:   +5/+5 Hamstring Flexion  +5/+5 Quadricep Extension  +5/+5 Hip Flexion  +5/+5 Hip Extension  +5/+5 Hip ABduction toward body  +5/+5 Hip ADduction away from body  +5/+5 Hip Internal Rotation at 90 Degrees  +5/+5 Hip External Rotation at 90 Degrees  +5/+5 Hip Internal Rotation at 0 Degrees  +5/+5 Hip External Rotation at 0 Degrees.            DTR/Neurological: 2-Point Discrimination:  Negative,Toe Walk normal,Heel Walk normal   +2/+4 Patellar Reflex (L-4)  +2/+4 Posterior Tibialis and Medial Hamstrings Reflex (L5)  +2/+4 Achilles Reflex (S-1).            Sensation/Neurological Lumbar: All findings relatively unchanged since last visit   Positive decreased    L1: Low back, hips, and groinPositive decreased on left  Negative L2: Low back and front of inside of upper leg/thigh  L3: Low back and front of upper leg/thigh Positive decreased on left   L4: Low back, front of upper leg/thigh, front of lower leg/calf, front of medial area of knee, and inside of ankle Positive decreased on left    L5: Low back, front and lateral knee, front and outside of lower leg/calf, top and bottom of foot and first four toes especially big toe. Positive decreased on left            Sensation/Neurological Sacrum: All findings relatively unchanged since last visit   Positive decreased    S1: low back, back of upper leg/thigh, back and inside of lower leg, calf and little toe Positive decreased on  left   Negative S2: Buttocks, genitals, back of upper leg/thigh and calves  Negative S3: Buttocks and genitals  Negative S4: Buttocks  Negative S5: Buttocks.      Sensation/Neurological Coccygeal:   Negative Sensation Intact, 2-Point Discrimination:    Negative Coccyx: Buttocks and area of tailbone.      Palpation: Positive  Tender to Palpation over the right SI Joint as well as lower lumbar spine articular pillarsAll findings relatively unchanged since last visit            Vascular:   Capillary Refill < 2 seconds  +2/+4Carotid  +2/+4 Dorsalis Pedis  +2/+4 Posterior Tibial.                Low Back-Disc Injury:All findings relatively unchanged since last visit   Valsalva Maneuver: Negative.   Fermoral Nerve Traction Test: Positive  left refers to left side and right refers to middle and left side.     Slump Test:Positive left refers to left side; and right refers to middle and left side.   Cross Test Seated: Positive  Laying Straight Leg Raise: Positive left refers to left side; and right refers to middle and left side.   Laseague Sign: Negative.   Laseague Differential Test: Negative.   Laseague Drop Test: Negative.   Seated Laseague Test: Negative.    Reverse Laseague Test: Negative.   Tip Toe Heel Walking Test: Negative.   Jose Prone Knee Flexion Test: Negative.   Multifidus Toe Touch Test (MT3): Negative.  Prone Instability Test (PIT): Negative  Multifidus Lift Test (MLT): Negative       Low Back-SI Joint:  Three-Phase Hyperextension Test: Negative.    Yeoman Test: Negative.   Sacroilliac Stress Test: Negative.   Abduction Stress Test: Negative.           Low Back-Spondy:All findings relatively unchanged since last visit   Stork Test: Positive left.   Sphinx Test: Positive mostly after activity  Modified Sphinx Test: Positive     Feet/Foot:   Positive BILATERAL Valgus foot      Imaging and Diagnostics Review:  Left leg shorter than the Right leg by the following  Iliac Crest 6 mm   Sacral Base 5 mm  Femoral  heads 5 mm   Median difference of Left leg shorter than the Right leg is 5.33 mm  ----------------------------------------------  MR arthrogram hip left  Order date: 2/18/2025  Interpreted By:  Vitaly Garnett  and Fernandez Ruvalcaba   STUDY:  MR arthrogram of the  left hip;  2/18/2025 2:30 pm      INDICATION:  Signs/Symptoms:left hip pain.      ,M25.552 Pain in left hip,S76.012D Strain of muscle, fascia and  tendon of left hip, subsequent encounter,S76.012D Strain of muscle,  fascia and tendon of left hip, subsequent encounter,M53.3  Sacrococcygeal disorders, not elsewhere classified,M25.852 Other  specified joint disorders, left hip,G57.02 Lesion of sciatic nerve,  left lower limb,M99.04 Segmental and somatic dysfunction of sacral  region      COMPARISON:  Left hip radiographs 10/18/2024      ACCESSION NUMBER(S):  [id number]      ORDERING CLINICIAN:  NAKUL NELSON      TECHNIQUE:  MR imaging of the  left hip was obtained following the  intra-articular administration of dilute gadolinium contrast material.      FINDINGS:  TENDONS:  The insertions of the gluteus medius and gluteus minimus tendons are  intact. The insertion of the iliopsoas tendon is intact.  The origin of the rectus femoris tendon is intact.  The visualized hamstring tendon origin is intact.      JOINTS:  Contrast material is identified within the hip joint.  The hip joint articular cartilage is normal without focal defect.  There is no evidence of significant arthrosis. The superior and  anterosuperior acetabular labrum demonstrates irregular morphology,  truncation, curvilinear intrasubstance T2 hyperintense signal, and a  fluid cleft of the chondrolabral junction (series 7, images 14-18).  There is also tearing of the anterior acetabular labrum. There is no  evidence of avascular necrosis. Alpha angle of the hip is measured at  approximately 62 degrees.      OSSEOUS STRUCTURES:  No focal marrow replacing lesions are identified. There is  no  fracture.      SOFT TISSUES:  A small amount of iatrogenic contrast material is identified within  the anterior soft tissues related to arthrographic approach. No  muscle atrophy or tear is seen.      INTERNAL ORGANS:  Evaluation of the internal organs of the pelvis is limited on this  small field of view study tailored for evaluation of the  musculoskeletal system. No gross abnormality is seen in the pelvic  organs.      IMPRESSION MR arthrogram of the left hip February 18, 2025:  1. Left femoral CAM morphology with an alpha angle of approximately  62 degrees.  2. Complex left acetabular labral tearing as described.          Signed by: Vitaly Garnett 2/18/2025 3:40 PM  Dictation workstation:   [id number]     -----------------------------------------------  MR Lumbar Spine ([facility])  02/06/2025  IMPRESSION:  Disc Disease of the T12-S1 level and the lumbar spine as described above.  T12-S1 multilevel disc bulges  Disc annular tear L4-L5 and L5-S1  Neural foraminal stenosis L3-L4 moderate left; L4-L5 moderate left, mild right; L5-S1 Mild right  Facet hypertrophy L3-S1  ------------------------------------------------  XR lumbar spine 2-3 views  Narrative & Impression   Interpreted By:  Bob Shelton,   STUDY:  XR LUMBAR SPINE 2-3 VIEWS      INDICATION:  Signs/Symptoms:LOW BACK PAIN.      COMPARISON:  None      ACCESSION NUMBER(S):  [id number]      ORDERING CLINICIAN:  AKSHAT AYERS      FINDINGS:  Minimal L4-S1 facet arthrosis      Mild scoliosis.      Mild discogenic degenerative disease of the thoracolumbar junction.      No evidence of fracture.      IMPRESSION:  Minimal L4-S1 facet arthrosis  2. Mild scoliosis.  3. Mild discogenic degenerative disease of the thoracolumbar junction.      Signed by: Bob Shelton 10/19/2024 8:30 AM  Dictation workstation:   [id number]   -*---------------------------------------------------------  XR hip left with pelvis when performed 2 or 3 views  Narrative &  Impression   Interpreted By:  Bob Shelton,   STUDY:  XR HIP LEFT WITH PELVIS WHEN PERFORMED 2 OR 3 VIEWS      INDICATION:  Signs/Symptoms:LEFT HIP PAIN.      COMPARISON:  None      ACCESSION NUMBER(S):  [id number]      ORDERING CLINICIAN:  AKSHAT AYERS      FINDINGS:  No evidence of fracture. Small os acetabula. Joint space normal.  Small bone island femoral neck.      IMPRESSION:  No acute findings left hip      Signed by: Bob Shelton 10/19/2024 8:30 AM  Dictation workstation:   [id number]       Assessment   1. Bulging of lumbar intervertebral disc        2. Left hip pain        3. Strain of flexor muscle of left hip, subsequent encounter        4. SI (sacroiliac) joint dysfunction        5. Lumbar spine strain, subsequent encounter        6. Acquired leg length discrepancy        7. Left hip impingement syndrome        8. Piriformis syndrome of left side        9. Discitis of lumbosacral region        10. Sacral region somatic dysfunction        11. Degenerative tear of acetabular labrum of left hip        12. Strain of lumbar paraspinal muscle, initial encounter        13. Left hip flexor tightness        14. Osteoarthritis of spine with radiculopathy, lumbar region        15. Hamstring tightness of both lower extremities        16. Annular tear of lumbar disc        17. Neural foraminal stenosis of lumbosacral spine        18. Facet hypertrophy of lumbosacral region        19. Sprain, gluteus medius, left, subsequent encounter                Treatment or Intervention:  May continue to alternate ice and moist heat therapy as needed.  Continue physical Therapy 1-2 times a week for 8-10 weeks with manual therapy as well as dry needling and IASTM as well as lumbar traction  Went over modifications to be done by the patient for working out  Once again stressed the importance of wearing shoes with good stability control to help with the biomechanics affecting the lower legs  Once again stressed the  importance of wearing full foot insoles to help with the biomechanics affecting the lower legs  Continue over-the-counter  vitamin-D3 3532-8024+ units a day with food as well as a daily multivitamin  Continue over-the-counter Move Free for joint health.  Patient advised regarding the risk and/or potential adverse reactions and/or side effects of any prescribed medications along with any over-the-counter medications or any supplements used. Patient advised to seek immediate medical care if any adverse reactions occur. The patient and/or patient(s) parent(s) verbalized their understanding.  Again, Discussed in detail with the patient to the level of their understanding the possibility in the future of regenerative injections  versus viscosupplementation injections versus a combination of both  Continue, Referral to Dr. Gallardo next week for epidural injection  Continue,Recommendation inversion table  Patient to continue wearing 6 mm heel lift to place in the left shoe to accommodate leg length discrepancy  Went over workout modifications to be done for squatting and lunges and other activities  Recommendation to wear weight lifting belt at the gym  Recommendation to wear back brace and bracing representative Camilo Wynn for a back brace  Follow-up in 6 weeks or sooner if doing injections.    Please note that this report has been produced using speech recognition software.  It may contain errors related to grammar, punctuation or spelling.  Electronically signed, but not reviewed.  Nakul Nelson D.O. [place], Director of Sports Medicine    NAKUL NELSON on 2/20/25 at 10:22 AM.     LENORE Brown DO

## 2025-02-20 ENCOUNTER — OFFICE VISIT (OUTPATIENT)
Dept: SPORTS MEDICINE | Facility: CLINIC | Age: 35
End: 2025-02-20
Payer: COMMERCIAL

## 2025-02-20 ENCOUNTER — APPOINTMENT (OUTPATIENT)
Dept: SPORTS MEDICINE | Facility: CLINIC | Age: 35
End: 2025-02-20
Payer: COMMERCIAL

## 2025-02-20 VITALS
DIASTOLIC BLOOD PRESSURE: 78 MMHG | HEIGHT: 74 IN | WEIGHT: 229.28 LBS | SYSTOLIC BLOOD PRESSURE: 118 MMHG | BODY MASS INDEX: 29.43 KG/M2 | HEART RATE: 65 BPM

## 2025-02-20 DIAGNOSIS — M51.369 BULGING OF LUMBAR INTERVERTEBRAL DISC: Primary | ICD-10-CM

## 2025-02-20 DIAGNOSIS — M99.04 SACRAL REGION SOMATIC DYSFUNCTION: ICD-10-CM

## 2025-02-20 DIAGNOSIS — M46.47 DISCITIS OF LUMBOSACRAL REGION: ICD-10-CM

## 2025-02-20 DIAGNOSIS — S39.012A STRAIN OF LUMBAR PARASPINAL MUSCLE, INITIAL ENCOUNTER: ICD-10-CM

## 2025-02-20 DIAGNOSIS — M51.369 ANNULAR TEAR OF LUMBAR DISC: ICD-10-CM

## 2025-02-20 DIAGNOSIS — S39.012D LUMBAR SPINE STRAIN, SUBSEQUENT ENCOUNTER: ICD-10-CM

## 2025-02-20 DIAGNOSIS — M25.852 LEFT HIP IMPINGEMENT SYNDROME: ICD-10-CM

## 2025-02-20 DIAGNOSIS — M24.552 LEFT HIP FLEXOR TIGHTNESS: ICD-10-CM

## 2025-02-20 DIAGNOSIS — M48.07 NEURAL FORAMINAL STENOSIS OF LUMBOSACRAL SPINE: ICD-10-CM

## 2025-02-20 DIAGNOSIS — S76.012D STRAIN OF FLEXOR MUSCLE OF LEFT HIP, SUBSEQUENT ENCOUNTER: ICD-10-CM

## 2025-02-20 DIAGNOSIS — M62.9 HAMSTRING TIGHTNESS OF BOTH LOWER EXTREMITIES: ICD-10-CM

## 2025-02-20 DIAGNOSIS — M25.552 LEFT HIP PAIN: ICD-10-CM

## 2025-02-20 DIAGNOSIS — M47.26 OSTEOARTHRITIS OF SPINE WITH RADICULOPATHY, LUMBAR REGION: ICD-10-CM

## 2025-02-20 DIAGNOSIS — S76.012D SPRAIN, GLUTEUS MEDIUS, LEFT, SUBSEQUENT ENCOUNTER: ICD-10-CM

## 2025-02-20 DIAGNOSIS — M47.817 FACET HYPERTROPHY OF LUMBOSACRAL REGION: ICD-10-CM

## 2025-02-20 DIAGNOSIS — M24.152 DEGENERATIVE TEAR OF ACETABULAR LABRUM OF LEFT HIP: ICD-10-CM

## 2025-02-20 DIAGNOSIS — M53.3 SI (SACROILIAC) JOINT DYSFUNCTION: ICD-10-CM

## 2025-02-20 DIAGNOSIS — G57.02 PIRIFORMIS SYNDROME OF LEFT SIDE: ICD-10-CM

## 2025-02-20 DIAGNOSIS — M21.70 ACQUIRED LEG LENGTH DISCREPANCY: ICD-10-CM

## 2025-02-20 PROCEDURE — 1036F TOBACCO NON-USER: CPT | Performed by: FAMILY MEDICINE

## 2025-02-20 PROCEDURE — 99214 OFFICE O/P EST MOD 30 MIN: CPT | Performed by: FAMILY MEDICINE

## 2025-02-20 PROCEDURE — 3008F BODY MASS INDEX DOCD: CPT | Performed by: FAMILY MEDICINE

## 2025-02-20 ASSESSMENT — PAIN DESCRIPTION - DESCRIPTORS: DESCRIPTORS: ACHING;THROBBING

## 2025-02-20 ASSESSMENT — PAIN - FUNCTIONAL ASSESSMENT: PAIN_FUNCTIONAL_ASSESSMENT: 0-10

## 2025-02-20 ASSESSMENT — PAIN SCALES - GENERAL: PAINLEVEL_OUTOF10: 2

## 2025-02-20 NOTE — PATIENT INSTRUCTIONS
May continue to alternate ice and moist heat therapy as needed.  Continue physical Therapy 1-2 times a week for 8-10 weeks with manual therapy as well as dry needling and IASTM  Once again stressed the importance of wearing shoes with good stability control to help with the biomechanics affecting the lower legs  Once again stressed the importance of wearing full foot insoles to help with the biomechanics affecting the lower legs  Continue over-the-counter  vitamin-D3 4197-7099+ units a day with food as well as a daily multivitamin  Continue over-the-counter Move Free for joint health.  Patient advised regarding the risk and/or potential adverse reactions and/or side effects of any prescribed medications along with any over-the-counter medications or any supplements used. Patient advised to seek immediate medical care if any adverse reactions occur. The patient and/or patient(s) parent(s) verbalized their understanding.  Again, Discussed in detail with the patient to the level of their understanding the possibility in the future of regenerative injections versus corticosteroids injections versus viscosupplementation injections  Continue, Referral to Dr. Gallardo for epidural injection  Continue,Recommendation inversion table  Patient to continue wearing 6 mm heel lift to place in the left shoe to accommodate leg length discrepancy  Follow-up in 6 weeks or sooner if doing injections.

## 2025-03-03 ENCOUNTER — APPOINTMENT (OUTPATIENT)
Dept: PHYSICAL THERAPY | Facility: CLINIC | Age: 35
End: 2025-03-03
Payer: COMMERCIAL

## 2025-04-23 NOTE — PROGRESS NOTES
Verbal consent of the patient and/or verbal parental consent for patients under the age of 18 have been obtained to conduct a physical examination at this office visit.    Established patient  History Of Present Illness  04/23/25 Valdo Ernst is a 34 y.o. male who presents for follow up evaluation of his Left HIP and Lumbar spine.       All previous Progress Notes and imaging results related to this patients chief complaint have been reviewed in preparation for this examination.    Past Medical History  He has no past medical history on file.    Surgical History  He has a past surgical history that includes Other surgical history (10/29/2021) and Vasectomy.     Social History  He reports that he has never smoked. He has never used smokeless tobacco. He reports current alcohol use. He reports that he does not use drugs.    Family History  Family History   Problem Relation Name Age of Onset    Crohn's disease Father       History Of Present Illness  10/18/24 Valdo Ernst is a 33 y.o. male presenting with LEFT hip pain. Pt owns a deck building company.  Pt states that a majority of his past injuries have been on his LEFT side. In the past year, he has tried to go away from strength training and in to more stretching. Pain has worsened within the past month that he is in today for further evaluation. Pain in the sciatica, wrapping in to lateral aspect of his hip in to his groin and down his leg. Notes tightness and pain in all movement. Pain is to the point of it waking him up. Pain with all range of motion. Denies any numbness or tingling. Pt has tried: foam rolling, stretching, soft tissue massage, heat, ice, NSAIDs as needed. Rates current pain as a 0/10 but with activity and daily activities a 8/10.  We discussed treatment options.  Upon exam patient has indications of an SI joint dysfunction as well as a upper glutes strain and low back strain.  Patient also has a leg length discrepancy noted on exam.   As such after discussion we will order x-rays of the lower back and hips and pelvis to evaluate for leg length discrepancy or other occult pathology.  Patient will start into physical therapy as soon as he is able.  Advised patient to look into getting insoles and wearing supportive footwear.  Patient can follow-up in 6 weeks and we can reevaluate at that time with consideration of more advanced imaging such as MRI or CT is indicated.  Patient verbalizes understanding and agreement with plan of care.   ----------------------------------------------------------  02/10/25 Valdo Ernst is a 34 y.o. male who presents MRI review of their Lumbar spine. States that he continues to have 6/10 pain in the left hip. He has not yet been able to get a left hip MRI. States that he continues to have the same pains as before. He continues to go to physical therapy. States that when he works and is bending over picking up items his back hurts worse but when walking and twisting he feels more pain in his left hip.  He explains that his insurance will cover an MR Arthrogram for the left hip, however wants it to be completed at an outside facility. He has reached out to the approved facilities and none of them are able to complete an MR Arthrogram.   Still the recommendation before his next step would be to get an MRI arthrogram of the hip since he is having both hip and back issues.  I told him most likely referral pain going down his leg is coming from his back and the issues getting into his groin is from his labrum especially after his physical examination.  Will try to once again do another peer to peer which is confusing because when I did it last time I asked about the hip and they said that he was approved to go wherever and then all of a sudden they said that he could not go to a hospital facility.  He is called around to everyplace around and no one has the MR arthrograms except at the hospital.  The only other option  would be a 3T MRI but once again it is unknown if insurance would approve this either I told him I would do another peer to peer if necessary and we will try to get a hold of them.  I also recommended he gets a hold of Dr. Gallardo ahead of time for epidural injections to at least help his back.  Also that we will give him a back brace that he can use while he is at work.  Once again he continues on with NSAIDs and Tylenol and that did not seem to help so I told him we are going to give him some oral steroids as well as some injections of steroid today to jumpstart stuff.  Also physical therapy he is significantly plateaued and has not been getting any better over the past 3 to 4 months.  ---------------------------------------------------------  02/20/25 Valdo Ernst is a 34 y.o. male who presents MRI review of their Left HIP. 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  He states that he has been more mindful of his posture and works out to help his strength.  He has also been taking prednisone as prescribed.  He states that he will go back to Physical Therapy on March 3rd.  He also has an appointment schedule with Dr. Gallardo next week.  The patient states that his pain is mostly in the left hip and he points right in the anterior superior region of the hip right in the area where his labral tear is located and when it increases he feels it in his back and separately he points in the region of around L4-L5.  He states that today, his pain is 0/10 but when he squats down or bends over to pick something up his pain is 2/10.  Additionally the patient states that he feels like the back brace he was given was too big so we are ordering him a Large Brace to replace it.  I told him I will get a hold of the bracing representative Jani Wynn to get a better fitting brace.  We also talked about regenerative injections for his hip  down the line which she is going to discuss with his wife about.  He says also he will be getting back into physical therapy in March 3 and working with Abhishek Traore.  We also talked about modifications for his workouts for using belt squats as well as lunges and other workouts.  Additionally stressed the importance of wearing a lifting belt whenever he is working out as well.  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   Allergies  Patient has no known allergies.    Review of Systems  CONSTITUTIONAL:   Negative for weight change, loss of appetite, fatigue, weakness, fever, chills, night sweats, headaches .           HEENT:   Negative for cold, cough, sore throat, sinus pain, swollen lymph nodes.           OPHTHALMOLOGY:   Negative for diminished vision, blurred vision, loss of vision, double vision.           ALLERGY:   Negative for runny nose, scratchy throat, sinus congestion, rash, facial pressure, nasal congestion, post-nasal drip.           CARDIOLOGY:   Negative for chest pain, palpitations, murmurs, irregular heart beat, shortness of breath, leg edema, dyspnea on exertion, fatigue, dizziness.           RESPIRATORY:   Negative for chest pain, shortness of breath, swelling of the legs, asthma/copd, chest congestion, pain with breathing .           GASTROENTEROLOGY:   Negative for nausea, vomitting, heartburn, constipation, diarrhea, blood in stool, change in bowel habits, black stool.           HEMATOLOGY/LYMPH:   Negative for fatigue, loss of appetitie, easy bruising, easy bleeding, anemia, abnormal bleeding, slow healing.           ENDOCRINOLOGY:   Negative for polyuria, polydipsia, polyphagia, fatigue, weight loss, weight gain, cold intolerance, heat intolerance, diabetes.           MUSCULOSKELETAL:   Positive  for Left HIP Pain        DERMATOLOGY:   Negative for rash, bruising.           NEUROLOGY:    Negative for tingling, numbness, gait abnormality, paresthesias, weakness, sciatica.        Examination: All findings relatively unchanged since last visit   Left Hip   Edema: Negative   Ecchymosis/Bruising: Negative.   Percussion Test: Negative.   Tuning Fork Test: Negative.   Orientation: Symmetrical.      ROM:   All findings relatively unchanged since last visit   Left Hip   Positive Internal Rotation (30-35 degrees) decreased, and causes pain  Positive External Rotation (45-60 degrees) decreased,and causes pain  Flexion (120 degrees)   Extension (15-30 degrees)   ABduction (45-50 degrees)   Positive ADduction (20-30 degrees) decreased, causes pain             Muscle Strength:   +5/+5 Hamstring Flexion  +5/+5 Quadricep Extension         +5/+5 Hip Flexion   +5/+5Hip Extension                   +5/+5 Hip ABduction away from body   +5/+5 Hip ADduction towards body           +5/+5 Hip Internal Rotation at 90 Degrees   +5/+5 Hip External Rotation at 90 Degrees                   +5/+5 Hip Internal Rotation at 0 Degrees   +5/+5 Hip External Rotation at 0 Degrees      DTR/Neurological:    Sensation 2-Point Discrimination: Negative.            Palpation: All findings relatively unchanged since last visit   Left Hip   Positive  tender to palpation over Left Hip Flexor and ADDuctor(s) as well as IT band           Vascular:   Negative: Normal Pulses   +2/+4, Femoral Artery, DP, PT  Capillary Refill < 2 seconds.            Function Tests: All findings relatively unchanged since last visit   Left Hip   Test for Rectus Femoris Contracture: Positive  Hip Extension Test: Positive    Iliotibial Tract Test: Positive    Mark Anthony :  Positive    Mark Anthony Test:  Positive    Steve Test: Positive              Hip/Pelvis - Flexibility: All findings relatively unchanged since last visit   Left Hip   Hamstring Positive   Hip Flexor  Positive    IT-Band  Positive           Hip/Pelvis - Hip Contractures:  Finger Tip Test: Negative.   Mark Anthony  Test: Negative.   Mark Anthony  Test: Negative.   Piriformis Test: Negative.      Hip/Pelvis - Hip Dysplasia:  Trochanter Irritation Sign: Negative.   Galeazzi Test: Negative.   Kalchschmidt Test: Negative.            Hip/Pelvis - Impingement/Labrum: All findings relatively unchanged since last visit   Left Hip   GAVIN Test: Positive            FADIR Test:   Positive  MORE THAN GAVIN    Hip Scouring Test:  Positive        Mcdaniel's Test:  Positive        Posterior Labrum (Posterior Margin) Test:  Negative      Posterior Impingement (Posterior Labrum) [Torque] Test: Negative    Anterior Impingement (Anterior Labrum) Test: Negative   Psoas Sign:   Negative               Hip/Pelvis - IT Band Syndrome: All findings relatively unchanged since last visit   Left Hip   Steve's (Iliotibial Tract) Test: Positive    Mark Compression Test: Positive    J-Sign: Negative.            Hip/Pelvis - SFE:  Drehmann Test: Negative.            Hip/Pelvis - Trochanteric/Pelvis Muscle Function:  Trendelenburg/Duchenne Sign: Negative.   Duchenne Sign: Negative.       Leg Length:  Leg Length Supine: Positive right leg shorter than left leg on physical examination however x-rays show left leg is the actual true short leg   leg Length Supine to Seated (Derbolowsky Sign):  Positive right leg shorter than left leg on physical examination however x-rays show left leg is the actual true short leg    ---------------------------------------------------  Examination:All findings relatively unchanged since last visit   Left Hip   Left Lumbar Spine  Edema: Negative.   TART Findings: Positive  Tenderness Right SI joint and additionally lower lumbar spine articular pillars  Ecchymosis/Bruising: Negative.   Percussion Test (LUMBAR): Negative.   Tuning Fork Test (LUMBAR): Negative.   Percussion (Sacrum): Negative.   Tuning Fork (Sacrum): Negative.      Orientation: All findings relatively unchanged since last visit   Left Hip     Orientation  (LUMBAR):Positive decreased lumbar lordosis due to muscle spasmsAll findings relatively unchanged since last visit   Left Hip     Orientation (Sacrum): Negative      ROM (LUMBAR): All findings relatively unchanged since last visit    Positive decreased with flexion and extension     Sacrum:All findings relatively unchanged since last visit   Standing Flexion Test: Positive Left (decreased pain but reports feeling tension)  Seated Flexion Test: Positive Left(decreased pain but reports feeling tension)  Spring Test: Negative   Sacral Somatic Dysfunction: Positive Right rotation on right axis  Hip Flexor Tightness: Positive left greater than right  Hamstring Tightness: Positive   right greater than left        Muscle Strength:   +5/+5 Hamstring Flexion  +5/+5 Quadricep Extension  +5/+5 Hip Flexion  +5/+5 Hip Extension  +5/+5 Hip ABduction toward body  +5/+5 Hip ADduction away from body  +5/+5 Hip Internal Rotation at 90 Degrees  +5/+5 Hip External Rotation at 90 Degrees  +5/+5 Hip Internal Rotation at 0 Degrees  +5/+5 Hip External Rotation at 0 Degrees.            DTR/Neurological: 2-Point Discrimination:  Negative,Toe Walk normal,Heel Walk normal   +2/+4 Patellar Reflex (L-4)  +2/+4 Posterior Tibialis and Medial Hamstrings Reflex (L5)  +2/+4 Achilles Reflex (S-1).            Sensation/Neurological Lumbar: All findings relatively unchanged since last visit   Positive decreased    L1: Low back, hips, and groinPositive decreased on left  Negative L2: Low back and front of inside of upper leg/thigh  L3: Low back and front of upper leg/thigh Positive decreased on left   L4: Low back, front of upper leg/thigh, front of lower leg/calf, front of medial area of knee, and inside of ankle Positive decreased on left    L5: Low back, front and lateral knee, front and outside of lower leg/calf, top and bottom of foot and first four toes especially big toe. Positive decreased on left            Sensation/Neurological Sacrum: All  findings relatively unchanged since last visit   Positive decreased    S1: low back, back of upper leg/thigh, back and inside of lower leg, calf and little toe Positive decreased on left   Negative S2: Buttocks, genitals, back of upper leg/thigh and calves  Negative S3: Buttocks and genitals  Negative S4: Buttocks  Negative S5: Buttocks.      Sensation/Neurological Coccygeal:   Negative Sensation Intact, 2-Point Discrimination:    Negative Coccyx: Buttocks and area of tailbone.      Palpation: Positive  Tender to Palpation over the right SI Joint as well as lower lumbar spine articular pillarsAll findings relatively unchanged since last visit            Vascular:   Capillary Refill < 2 seconds  +2/+4Carotid  +2/+4 Dorsalis Pedis  +2/+4 Posterior Tibial.                Low Back-Disc Injury:All findings relatively unchanged since last visit   Valsalva Maneuver: Negative.   Fermoral Nerve Traction Test: Positive  left refers to left side and right refers to middle and left side.     Slump Test:Positive left refers to left side; and right refers to middle and left side.   Cross Test Seated: Positive  Laying Straight Leg Raise: Positive left refers to left side; and right refers to middle and left side.   Laseague Sign: Negative.   Laseague Differential Test: Negative.   Laseague Drop Test: Negative.   Seated Laseague Test: Negative.    Reverse Laseague Test: Negative.   Tip Toe Heel Walking Test: Negative.   Jose Prone Knee Flexion Test: Negative.   Multifidus Toe Touch Test (MT3): Negative.  Prone Instability Test (PIT): Negative  Multifidus Lift Test (MLT): Negative       Low Back-SI Joint:  Three-Phase Hyperextension Test: Negative.    Yeoman Test: Negative.   Sacroilliac Stress Test: Negative.   Abduction Stress Test: Negative.           Low Back-Spondy:All findings relatively unchanged since last visit   Stork Test: Positive left.   Sphinx Test: Positive mostly after activity  Modified Sphinx Test: Positive      Feet/Foot:   Positive BILATERAL Valgus foot      Imaging and Diagnostics Review:  Left leg shorter than the Right leg by the following  Iliac Crest 6 mm   Sacral Base 5 mm  Femoral heads 5 mm   Median difference of Left leg shorter than the Right leg is 5.33 mm  ----------------------------------------------  MR arthrogram hip left  Order date: 2/18/2025  Interpreted By:  Vitaly Garnett,  and Fernandez Ruvalcaba   STUDY:  MR arthrogram of the  left hip;  2/18/2025 2:30 pm      INDICATION:  Signs/Symptoms:left hip pain.      ,M25.552 Pain in left hip,S76.012D Strain of muscle, fascia and  tendon of left hip, subsequent encounter,S76.012D Strain of muscle,  fascia and tendon of left hip, subsequent encounter,M53.3  Sacrococcygeal disorders, not elsewhere classified,M25.852 Other  specified joint disorders, left hip,G57.02 Lesion of sciatic nerve,  left lower limb,M99.04 Segmental and somatic dysfunction of sacral  region      COMPARISON:  Left hip radiographs 10/18/2024      ACCESSION NUMBER(S):  [id number]      ORDERING CLINICIAN:  NAKUL NELSON      TECHNIQUE:  MR imaging of the  left hip was obtained following the  intra-articular administration of dilute gadolinium contrast material.      FINDINGS:  TENDONS:  The insertions of the gluteus medius and gluteus minimus tendons are  intact. The insertion of the iliopsoas tendon is intact.  The origin of the rectus femoris tendon is intact.  The visualized hamstring tendon origin is intact.      JOINTS:  Contrast material is identified within the hip joint.  The hip joint articular cartilage is normal without focal defect.  There is no evidence of significant arthrosis. The superior and  anterosuperior acetabular labrum demonstrates irregular morphology,  truncation, curvilinear intrasubstance T2 hyperintense signal, and a  fluid cleft of the chondrolabral junction (series 7, images 14-18).  There is also tearing of the anterior acetabular labrum. There is no  evidence  of avascular necrosis. Alpha angle of the hip is measured at  approximately 62 degrees.      OSSEOUS STRUCTURES:  No focal marrow replacing lesions are identified. There is no  fracture.      SOFT TISSUES:  A small amount of iatrogenic contrast material is identified within  the anterior soft tissues related to arthrographic approach. No  muscle atrophy or tear is seen.      INTERNAL ORGANS:  Evaluation of the internal organs of the pelvis is limited on this  small field of view study tailored for evaluation of the  musculoskeletal system. No gross abnormality is seen in the pelvic  organs.      IMPRESSION MR arthrogram of the left hip February 18, 2025:  1. Left femoral CAM morphology with an alpha angle of approximately  62 degrees.  2. Complex left acetabular labral tearing as described.          Signed by: Vitaly Garnett 2/18/2025 3:40 PM  Dictation workstation:   [id number]     -----------------------------------------------  MR Lumbar Spine ([facility])  02/06/2025  IMPRESSION:  Disc Disease of the T12-S1 level and the lumbar spine as described above.  T12-S1 multilevel disc bulges  Disc annular tear L4-L5 and L5-S1  Neural foraminal stenosis L3-L4 moderate left; L4-L5 moderate left, mild right; L5-S1 Mild right  Facet hypertrophy L3-S1  ------------------------------------------------  XR lumbar spine 2-3 views  Narrative & Impression   Interpreted By:  Bob Shelton,   STUDY:  XR LUMBAR SPINE 2-3 VIEWS      INDICATION:  Signs/Symptoms:LOW BACK PAIN.      COMPARISON:  None      ACCESSION NUMBER(S):  [id number]      ORDERING CLINICIAN:  AKSHAT AYERS      FINDINGS:  Minimal L4-S1 facet arthrosis      Mild scoliosis.      Mild discogenic degenerative disease of the thoracolumbar junction.      No evidence of fracture.      IMPRESSION:  Minimal L4-S1 facet arthrosis  2. Mild scoliosis.  3. Mild discogenic degenerative disease of the thoracolumbar junction.      Signed by: Bob Shelton 10/19/2024  8:30 AM  Dictation workstation:   [id number]   -*---------------------------------------------------------  XR hip left with pelvis when performed 2 or 3 views  Narrative & Impression   Interpreted By:  Bob Shelton,   STUDY:  XR HIP LEFT WITH PELVIS WHEN PERFORMED 2 OR 3 VIEWS      INDICATION:  Signs/Symptoms:LEFT HIP PAIN.      COMPARISON:  None      ACCESSION NUMBER(S):  [id number]      ORDERING CLINICIAN:  AKSHAT AYERS      FINDINGS:  No evidence of fracture. Small os acetabula. Joint space normal.  Small bone island femoral neck.      IMPRESSION:  No acute findings left hip      Signed by: Bob Shelton 10/19/2024 8:30 AM  Dictation workstation:   [id number]       Assessment   No diagnosis found.      Treatment or Intervention:  May continue to alternate ice and moist heat therapy as needed.  Continue physical Therapy 1-2 times a week for 8-10 weeks with manual therapy as well as dry needling and IASTM as well as lumbar traction  Went over modifications to be done by the patient for working out  Once again stressed the importance of wearing shoes with good stability control to help with the biomechanics affecting the lower legs  Once again stressed the importance of wearing full foot insoles to help with the biomechanics affecting the lower legs  Continue over-the-counter  vitamin-D3 2223-8207+ units a day with food as well as a daily multivitamin  Continue over-the-counter Move Free for joint health.  Patient advised regarding the risk and/or potential adverse reactions and/or side effects of any prescribed medications along with any over-the-counter medications or any supplements used. Patient advised to seek immediate medical care if any adverse reactions occur. The patient and/or patient(s) parent(s) verbalized their understanding.  Again, Discussed in detail with the patient to the level of their understanding the possibility in the future of regenerative injections  versus viscosupplementation  injections versus a combination of both  Continue, Referral to Dr. Gallardo next week for epidural injection  Continue,Recommendation inversion table  Patient to continue wearing 6 mm heel lift to place in the left shoe to accommodate leg length discrepancy  Went over workout modifications to be done for squatting and lunges and other activities  Recommendation to wear weight lifting belt at the gym  Recommendation to wear back brace and bracing representative Camilo Abimaeljocelynn for a back brace  Follow-up     Please note that this report has been produced using speech recognition software.  It may contain errors related to grammar, punctuation or spelling.  Electronically signed, but not reviewed.  Keyon Henderson D.O. [facility], Director of Sports Medicine    YULI BOB on 4/23/25 at 7:22 AM.     LENORE Brown DO

## 2025-04-30 ENCOUNTER — APPOINTMENT (OUTPATIENT)
Dept: SPORTS MEDICINE | Facility: CLINIC | Age: 35
End: 2025-04-30
Payer: COMMERCIAL

## 2025-04-30 DIAGNOSIS — M46.47 DISCITIS OF LUMBOSACRAL REGION: ICD-10-CM

## 2025-04-30 DIAGNOSIS — G57.02 PIRIFORMIS SYNDROME OF LEFT SIDE: ICD-10-CM

## 2025-04-30 DIAGNOSIS — S39.012D LUMBAR SPINE STRAIN, SUBSEQUENT ENCOUNTER: ICD-10-CM

## 2025-04-30 DIAGNOSIS — S76.012D STRAIN OF FLEXOR MUSCLE OF LEFT HIP, SUBSEQUENT ENCOUNTER: ICD-10-CM

## 2025-04-30 DIAGNOSIS — M53.3 SI (SACROILIAC) JOINT DYSFUNCTION: ICD-10-CM

## 2025-04-30 DIAGNOSIS — M25.852 LEFT HIP IMPINGEMENT SYNDROME: ICD-10-CM

## 2025-04-30 DIAGNOSIS — M21.70 ACQUIRED LEG LENGTH DISCREPANCY: ICD-10-CM

## 2025-04-30 DIAGNOSIS — M24.152 DEGENERATIVE TEAR OF ACETABULAR LABRUM OF LEFT HIP: ICD-10-CM

## 2025-04-30 DIAGNOSIS — M99.04 SACRAL REGION SOMATIC DYSFUNCTION: ICD-10-CM

## 2025-04-30 DIAGNOSIS — M25.552 LEFT HIP PAIN: ICD-10-CM
